# Patient Record
Sex: MALE | Race: BLACK OR AFRICAN AMERICAN | Employment: FULL TIME | URBAN - METROPOLITAN AREA
[De-identification: names, ages, dates, MRNs, and addresses within clinical notes are randomized per-mention and may not be internally consistent; named-entity substitution may affect disease eponyms.]

---

## 2022-06-21 ENCOUNTER — HOSPITAL ENCOUNTER (EMERGENCY)
Age: 42
Discharge: HOME | End: 2022-06-21
Attending: STUDENT IN AN ORGANIZED HEALTH CARE EDUCATION/TRAINING PROGRAM | Admitting: STUDENT IN AN ORGANIZED HEALTH CARE EDUCATION/TRAINING PROGRAM
Payer: COMMERCIAL

## 2022-06-21 ENCOUNTER — APPOINTMENT (OUTPATIENT)
Dept: CT IMAGING | Age: 42
End: 2022-06-21
Attending: STUDENT IN AN ORGANIZED HEALTH CARE EDUCATION/TRAINING PROGRAM
Payer: COMMERCIAL

## 2022-06-21 VITALS
RESPIRATION RATE: 19 BRPM | OXYGEN SATURATION: 99 % | WEIGHT: 165 LBS | SYSTOLIC BLOOD PRESSURE: 123 MMHG | TEMPERATURE: 98.1 F | DIASTOLIC BLOOD PRESSURE: 69 MMHG | HEIGHT: 72 IN | BODY MASS INDEX: 22.35 KG/M2 | HEART RATE: 62 BPM

## 2022-06-21 DIAGNOSIS — K52.9 COLITIS, ACUTE: Primary | ICD-10-CM

## 2022-06-21 LAB
ALBUMIN SERPL-MCNC: 4.2 G/DL (ref 3.4–5)
ALBUMIN/GLOB SERPL: 0.8 {RATIO} (ref 0.8–1.7)
ALP SERPL-CCNC: 88 U/L (ref 45–117)
ALT SERPL-CCNC: 29 U/L (ref 16–61)
ANION GAP SERPL CALC-SCNC: 8 MMOL/L (ref 3–18)
AST SERPL-CCNC: 24 U/L (ref 10–38)
BASOPHILS # BLD: 0 K/UL (ref 0–0.1)
BASOPHILS NFR BLD: 0 % (ref 0–2)
BILIRUB SERPL-MCNC: 0.4 MG/DL (ref 0.2–1)
BUN SERPL-MCNC: 15 MG/DL (ref 7–18)
BUN/CREAT SERPL: 14 (ref 12–20)
CALCIUM SERPL-MCNC: 9.4 MG/DL (ref 8.5–10.1)
CHLORIDE SERPL-SCNC: 103 MMOL/L (ref 100–111)
CO2 SERPL-SCNC: 25 MMOL/L (ref 21–32)
CREAT SERPL-MCNC: 1.09 MG/DL (ref 0.6–1.3)
DIFFERENTIAL METHOD BLD: ABNORMAL
EOSINOPHIL # BLD: 0 K/UL (ref 0–0.4)
EOSINOPHIL NFR BLD: 0 % (ref 0–5)
ERYTHROCYTE [DISTWIDTH] IN BLOOD BY AUTOMATED COUNT: 13 % (ref 11.6–14.5)
GLOBULIN SER CALC-MCNC: 5 G/DL (ref 2–4)
GLUCOSE SERPL-MCNC: 97 MG/DL (ref 74–99)
HCT VFR BLD AUTO: 40.4 % (ref 36–48)
HGB BLD-MCNC: 13 G/DL (ref 13–16)
IMM GRANULOCYTES # BLD AUTO: 0 K/UL (ref 0–0.04)
IMM GRANULOCYTES NFR BLD AUTO: 0 % (ref 0–0.5)
LIPASE SERPL-CCNC: 76 U/L (ref 73–393)
LYMPHOCYTES # BLD: 0.5 K/UL (ref 0.9–3.6)
LYMPHOCYTES NFR BLD: 5 % (ref 21–52)
MAGNESIUM SERPL-MCNC: 1.7 MG/DL (ref 1.6–2.6)
MCH RBC QN AUTO: 27.3 PG (ref 24–34)
MCHC RBC AUTO-ENTMCNC: 32.2 G/DL (ref 31–37)
MCV RBC AUTO: 84.7 FL (ref 78–100)
MONOCYTES # BLD: 0.4 K/UL (ref 0.05–1.2)
MONOCYTES NFR BLD: 4 % (ref 3–10)
NEUTS SEG # BLD: 9.3 K/UL (ref 1.8–8)
NEUTS SEG NFR BLD: 91 % (ref 40–73)
NRBC # BLD: 0 K/UL (ref 0–0.01)
NRBC BLD-RTO: 0 PER 100 WBC
PLATELET # BLD AUTO: 234 K/UL (ref 135–420)
PMV BLD AUTO: 10.4 FL (ref 9.2–11.8)
POTASSIUM SERPL-SCNC: 3.5 MMOL/L (ref 3.5–5.5)
PROT SERPL-MCNC: 9.2 G/DL (ref 6.4–8.2)
RBC # BLD AUTO: 4.77 M/UL (ref 4.35–5.65)
SODIUM SERPL-SCNC: 136 MMOL/L (ref 136–145)
WBC # BLD AUTO: 10.3 K/UL (ref 4.6–13.2)

## 2022-06-21 PROCEDURE — 96375 TX/PRO/DX INJ NEW DRUG ADDON: CPT

## 2022-06-21 PROCEDURE — 83735 ASSAY OF MAGNESIUM: CPT

## 2022-06-21 PROCEDURE — 74011250636 HC RX REV CODE- 250/636: Performed by: STUDENT IN AN ORGANIZED HEALTH CARE EDUCATION/TRAINING PROGRAM

## 2022-06-21 PROCEDURE — 85025 COMPLETE CBC W/AUTO DIFF WBC: CPT

## 2022-06-21 PROCEDURE — 74176 CT ABD & PELVIS W/O CONTRAST: CPT

## 2022-06-21 PROCEDURE — 74011000250 HC RX REV CODE- 250: Performed by: STUDENT IN AN ORGANIZED HEALTH CARE EDUCATION/TRAINING PROGRAM

## 2022-06-21 PROCEDURE — 83690 ASSAY OF LIPASE: CPT

## 2022-06-21 PROCEDURE — 74011250637 HC RX REV CODE- 250/637: Performed by: STUDENT IN AN ORGANIZED HEALTH CARE EDUCATION/TRAINING PROGRAM

## 2022-06-21 PROCEDURE — 96374 THER/PROPH/DIAG INJ IV PUSH: CPT

## 2022-06-21 PROCEDURE — 80053 COMPREHEN METABOLIC PANEL: CPT

## 2022-06-21 PROCEDURE — 99284 EMERGENCY DEPT VISIT MOD MDM: CPT

## 2022-06-21 PROCEDURE — C9113 INJ PANTOPRAZOLE SODIUM, VIA: HCPCS | Performed by: STUDENT IN AN ORGANIZED HEALTH CARE EDUCATION/TRAINING PROGRAM

## 2022-06-21 RX ORDER — ACETAMINOPHEN 500 MG
1000 TABLET ORAL ONCE
Status: COMPLETED | OUTPATIENT
Start: 2022-06-21 | End: 2022-06-21

## 2022-06-21 RX ORDER — AMOXICILLIN AND CLAVULANATE POTASSIUM 875; 125 MG/1; MG/1
1 TABLET, FILM COATED ORAL 2 TIMES DAILY
Qty: 20 TABLET | Refills: 0 | Status: SHIPPED | OUTPATIENT
Start: 2022-06-21 | End: 2022-07-01

## 2022-06-21 RX ORDER — ONDANSETRON 2 MG/ML
4 INJECTION INTRAMUSCULAR; INTRAVENOUS ONCE
Status: COMPLETED | OUTPATIENT
Start: 2022-06-21 | End: 2022-06-21

## 2022-06-21 RX ORDER — ONDANSETRON 4 MG/1
4 TABLET, FILM COATED ORAL
Qty: 14 TABLET | Refills: 0 | Status: SHIPPED | OUTPATIENT
Start: 2022-06-21

## 2022-06-21 RX ORDER — AMOXICILLIN AND CLAVULANATE POTASSIUM 875; 125 MG/1; MG/1
1 TABLET, FILM COATED ORAL
Status: COMPLETED | OUTPATIENT
Start: 2022-06-21 | End: 2022-06-21

## 2022-06-21 RX ORDER — OXYCODONE HYDROCHLORIDE 5 MG/1
5 TABLET ORAL
Qty: 12 TABLET | Refills: 0 | Status: SHIPPED | OUTPATIENT
Start: 2022-06-21 | End: 2022-07-02

## 2022-06-21 RX ORDER — MORPHINE SULFATE 4 MG/ML
4 INJECTION INTRAVENOUS ONCE
Status: COMPLETED | OUTPATIENT
Start: 2022-06-21 | End: 2022-06-21

## 2022-06-21 RX ORDER — DICYCLOMINE HYDROCHLORIDE 10 MG/1
10 CAPSULE ORAL 2 TIMES DAILY
Qty: 14 CAPSULE | Refills: 0 | Status: SHIPPED | OUTPATIENT
Start: 2022-06-21 | End: 2022-07-02

## 2022-06-21 RX ADMIN — SODIUM CHLORIDE 1000 ML: 900 INJECTION, SOLUTION INTRAVENOUS at 21:25

## 2022-06-21 RX ADMIN — ACETAMINOPHEN 1000 MG: 500 TABLET ORAL at 21:31

## 2022-06-21 RX ADMIN — LIDOCAINE HYDROCHLORIDE 40 ML: 20 SOLUTION ORAL; TOPICAL at 21:30

## 2022-06-21 RX ADMIN — ONDANSETRON 4 MG: 2 INJECTION INTRAMUSCULAR; INTRAVENOUS at 21:25

## 2022-06-21 RX ADMIN — SODIUM CHLORIDE 40 MG: 9 INJECTION INTRAMUSCULAR; INTRAVENOUS; SUBCUTANEOUS at 21:26

## 2022-06-21 RX ADMIN — MORPHINE SULFATE 4 MG: 4 INJECTION, SOLUTION INTRAMUSCULAR; INTRAVENOUS at 23:35

## 2022-06-21 RX ADMIN — AMOXICILLIN AND CLAVULANATE POTASSIUM 1 TABLET: 875; 125 TABLET, FILM COATED ORAL at 23:35

## 2022-06-21 NOTE — Clinical Note
[address] [place] [address] Dr [facility] EMERGENCY DEPT  [id number] [address] [phone]    Work/School Note    Date: 6/21/2022    To Whom It May concern:    Penny Asif was seen and treated today in the emergency room by the following provider(s):  Attending Provider: Charmayne Morton, DO. Penny Asif is excused from work/school on 06/21/22 and 06/22/22. He is medically clear to return to work/school on 6/23/2022.        Sincerely,          Juan Dudley RN

## 2022-06-21 NOTE — Clinical Note
[address] [place] [address] [address] [facility] EMERGENCY DEPT  [id number] [address] [phone]    Work/School Note    Date: 6/21/2022    To Whom It May concern:    Avtar Ernst was seen and treated today in the emergency room by the following provider(s):  Attending Provider: Lam Degroot DO. Avtar Ernst is excused from work/school on 06/21/22 and 06/22/22. He is medically clear to return to work/school on 6/23/2022.        Sincerely,          Laura Richard DO

## 2022-06-22 NOTE — ED PROVIDER NOTES
EMERGENCY DEPARTMENT HISTORY AND PHYSICAL EXAM      Date: 6/21/2022  Patient Name: Romana Fujisawa    History of Presenting Illness     Chief Complaint   Patient presents with    Abdominal Pain       History (Context): Romana Fujisawa is a 46 y.o. male with no significant past medical history comes into the ED today due to abdominal pain. Patient states abdominal pain began this morning is worsened since onset. He states that he is not taking any medication for treatment of his symptoms prior to arrival.  Was seen at a telehealth visit and prescribed medication however did not begin taking it yet. States pain is located mainly on the left upper and lower quadrant, and nonradiating. States that he has had 1 episode episode of vomiting and diarrhea. States symptoms are severe in quality. Denies any alleviating factors but states exacerbation with p.o. intake and ambulation. PCP: No primary care provider on file. Current Facility-Administered Medications   Medication Dose Route Frequency Provider Last Rate Last Admin    pantoprazole (PROTONIX) 40 mg in 0.9% sodium chloride 10 mL injection  40 mg IntraVENous ONCE Landon Hoffman,         acetaminophen (TYLENOL) tablet 1,000 mg  1,000 mg Oral ONCE Landon Hoffman, DO        mylanta/viscous lidocaine (GI COCKTAIL)  40 mL Oral ONCE Landon Hoffman, DO        ondansetron (ZOFRAN) injection 4 mg  4 mg IntraVENous ONCE Shanel Standing, DO           Past History     Past Medical History:   None reported    Past Surgical History:  No past surgical history on file. Family History:  No family history on file.     Social History:   Social History     Tobacco Use    Smoking status: Not on file    Smokeless tobacco: Not on file   Substance Use Topics    Alcohol use: Not on file    Drug use: Not on file     Denies alcohol or drug use    Allergies:  No Known Allergies    PMH, PSH, family history, social history, allergies reviewed with the patient with significant items noted above. Review of Systems   Review of Systems   Constitutional: Positive for fatigue. Negative for chills and fever. HENT: Negative for sore throat. Eyes: Negative for visual disturbance. Negative recent vision problems   Respiratory: Negative for shortness of breath. Cardiovascular: Negative for chest pain. Gastrointestinal: Positive for abdominal pain, diarrhea, nausea and vomiting. Genitourinary: Negative for difficulty urinating. Musculoskeletal: Negative for myalgias. Skin: Negative for rash. Neurological: Negative for headaches. Negative altered level of consciousness   All other systems reviewed and are negative. Physical Exam     Vitals:    06/21/22 2035   BP: 127/70   Pulse: 77   Resp: 19   Temp: 98.1 °F (36.7 °C)   SpO2: 100%   Weight: 74.8 kg (165 lb)   Height: 6' (1.829 m)       Physical Exam  Vitals and nursing note reviewed. Constitutional:       General: He is not in acute distress. Appearance: He is ill-appearing. He is not toxic-appearing. HENT:      Head: Normocephalic and atraumatic. Mouth/Throat:      Mouth: Mucous membranes are moist.   Eyes:      General: No scleral icterus. Conjunctiva/sclera: Conjunctivae normal.   Cardiovascular:      Rate and Rhythm: Normal rate and regular rhythm. Pulmonary:      Effort: Pulmonary effort is normal. No respiratory distress. Abdominal:      General: There is no distension. Palpations: Abdomen is soft. Tenderness: There is abdominal tenderness (To the left lower quadrant region). There is no guarding or rebound. Musculoskeletal:         General: No deformity. Normal range of motion. Cervical back: Normal range of motion and neck supple. Skin:     General: Skin is warm and dry. Findings: No rash. Neurological:      General: No focal deficit present. Mental Status: He is alert and oriented to person, place, and time. Mental status is at baseline. Psychiatric:         Mood and Affect: Mood normal.         Behavior: Behavior normal.         Diagnostic Study Results     Labs -   No results found for this or any previous visit (from the past 12 hour(s)). Labs Reviewed   CBC WITH AUTOMATED DIFF   METABOLIC PANEL, COMPREHENSIVE   LIPASE   MAGNESIUM       Radiologic Studies -   No orders to display     CT Results  (Last 48 hours)    None        CXR Results  (Last 48 hours)    None          The laboratory results, imaging results, and other diagnostic exams were reviewed in the EMR. Medical Decision Making   I am the first provider for this patient. I reviewed the vital signs, available nursing notes, past medical history, past surgical history, family history and social history. Vital Signs-Reviewed the patient's vital signs. Records Reviewed: Personally, on initial evaluation    MDM:   Fiona Cleveland presents with complaint of abdominal pain  DDX includes but is not limited to: Abdominal pain, diverticulitis, gastritis,    Patient overall ill-appearing but in no acute distress and vital signs grossly within normal limits. Will obtain lab work and imaging for further evaluation of patients complaint. Will continue to monitor and evaluate patient while in the ED. Orders as below:  Orders Placed This Encounter    CBC WITH AUTOMATED DIFF    COMPREHENSIVE METABOLIC PANEL    LIPASE    MAGNESIUM    pantoprazole (PROTONIX) 40 mg in 0.9% sodium chloride 10 mL injection    acetaminophen (TYLENOL) tablet 1,000 mg    mylanta/viscous lidocaine (GI COCKTAIL)    ondansetron (ZOFRAN) injection 4 mg    sodium chloride 0.9 % bolus infusion 1,000 mL        ED Course:   ED Course as of 06/22/22 0058   Tue Jun 21, 2022 2212 Patient's lab work grossly within normal limits. We will continue to monitor patient.  [DV]   1458 Patient CT scan shows colitis from the cecal tip to the distal transverse colon with mild inflammation/edema at the level of the cecum. Appendix normal.  No further signs of acute cholecystitis. Will provide patient with Augmentin while here in the emergency department for treatment of his colitis. Will recommend patient follow-up with GI for further evaluation. Patient verbalized understanding and is without any further questions. [DV]      ED Course User Index  [DV] Cari Postin, DO           Procedures:  Procedures        Diagnosis and Disposition     CLINICAL IMPRESSION:  No diagnosis found. There are no discharge medications for this patient. Disposition: Home    Patient condition at time of disposition: Stable    DISCHARGE NOTE:   Pt has been reexamined. Patient has no new complaints, changes, or physical findings. Care plan outlined and precautions discussed. Results were reviewed with the patient. All medications were reviewed with the patient. All of pt's questions and concerns were addressed. Alarm symptoms and return precautions associated with chief complaint and evaluation were reviewed with the patient in detail. The patient demonstrated adequate understanding. Patient was instructed to follow up with GI, as well as strict return precautions to the ED upon further deterioration. Patient is ready to go home. The patient is happy with this plan        Dragon Disclaimer     Please note that this dictation was completed with [email or web address], the computer voice recognition software. Quite often unanticipated grammatical, syntax, homophones, and other interpretive errors are inadvertently transcribed by the computer software. Please disregard these errors. Please excuse any errors that have escaped final proofreading. Zeinab CHILDS.

## 2022-06-24 ENCOUNTER — HOSPITAL ENCOUNTER (INPATIENT)
Age: 42
LOS: 8 days | Discharge: HOME | DRG: 853 | End: 2022-07-02
Attending: STUDENT IN AN ORGANIZED HEALTH CARE EDUCATION/TRAINING PROGRAM | Admitting: SURGERY
Payer: COMMERCIAL

## 2022-06-24 ENCOUNTER — APPOINTMENT (OUTPATIENT)
Dept: CT IMAGING | Age: 42
DRG: 853 | End: 2022-06-24
Attending: STUDENT IN AN ORGANIZED HEALTH CARE EDUCATION/TRAINING PROGRAM
Payer: COMMERCIAL

## 2022-06-24 DIAGNOSIS — K35.32 RUPTURED APPENDICITIS: Primary | ICD-10-CM

## 2022-06-24 DIAGNOSIS — G89.18 POSTOPERATIVE PAIN: ICD-10-CM

## 2022-06-24 LAB
ALBUMIN SERPL-MCNC: 3.3 G/DL (ref 3.4–5)
ALBUMIN/GLOB SERPL: 0.7 {RATIO} (ref 0.8–1.7)
ALP SERPL-CCNC: 126 U/L (ref 45–117)
ALT SERPL-CCNC: 33 U/L (ref 16–61)
ANION GAP SERPL CALC-SCNC: 7 MMOL/L (ref 3–18)
AST SERPL-CCNC: 35 U/L (ref 10–38)
BASOPHILS # BLD: 0 K/UL (ref 0–0.1)
BASOPHILS NFR BLD: 0 % (ref 0–2)
BILIRUB SERPL-MCNC: 0.7 MG/DL (ref 0.2–1)
BUN SERPL-MCNC: 13 MG/DL (ref 7–18)
BUN/CREAT SERPL: 10 (ref 12–20)
CALCIUM SERPL-MCNC: 8.6 MG/DL (ref 8.5–10.1)
CHLORIDE SERPL-SCNC: 95 MMOL/L (ref 100–111)
CO2 SERPL-SCNC: 30 MMOL/L (ref 21–32)
COVID-19 RAPID TEST, COVR: NOT DETECTED
CREAT SERPL-MCNC: 1.24 MG/DL (ref 0.6–1.3)
DIFFERENTIAL METHOD BLD: ABNORMAL
EOSINOPHIL # BLD: 0 K/UL (ref 0–0.4)
EOSINOPHIL NFR BLD: 0 % (ref 0–5)
ERYTHROCYTE [DISTWIDTH] IN BLOOD BY AUTOMATED COUNT: 12.9 % (ref 11.6–14.5)
GLOBULIN SER CALC-MCNC: 4.9 G/DL (ref 2–4)
GLUCOSE SERPL-MCNC: 115 MG/DL (ref 74–99)
HCT VFR BLD AUTO: 41.1 % (ref 36–48)
HGB BLD-MCNC: 13.7 G/DL (ref 13–16)
IMM GRANULOCYTES # BLD AUTO: 0 K/UL (ref 0–0.04)
IMM GRANULOCYTES NFR BLD AUTO: 0 % (ref 0–0.5)
LACTATE BLD-SCNC: 2.68 MMOL/L (ref 0.4–2)
LIPASE SERPL-CCNC: 64 U/L (ref 73–393)
LYMPHOCYTES # BLD: 0.8 K/UL (ref 0.9–3.6)
LYMPHOCYTES NFR BLD: 4 % (ref 21–52)
MAGNESIUM SERPL-MCNC: 2.3 MG/DL (ref 1.6–2.6)
MCH RBC QN AUTO: 27.5 PG (ref 24–34)
MCHC RBC AUTO-ENTMCNC: 33.3 G/DL (ref 31–37)
MCV RBC AUTO: 82.4 FL (ref 78–100)
MONOCYTES # BLD: 2.1 K/UL (ref 0.05–1.2)
MONOCYTES NFR BLD: 10 % (ref 3–10)
NEUTS SEG # BLD: 17.9 K/UL (ref 1.8–8)
NEUTS SEG NFR BLD: 86 % (ref 40–73)
NRBC # BLD: 0 K/UL (ref 0–0.01)
NRBC BLD-RTO: 0 PER 100 WBC
PLATELET # BLD AUTO: 197 K/UL (ref 135–420)
PLATELET COMMENTS,PCOM: ABNORMAL
PMV BLD AUTO: 9.8 FL (ref 9.2–11.8)
POTASSIUM SERPL-SCNC: 3.6 MMOL/L (ref 3.5–5.5)
PROT SERPL-MCNC: 8.2 G/DL (ref 6.4–8.2)
RBC # BLD AUTO: 4.99 M/UL (ref 4.35–5.65)
RBC MORPH BLD: ABNORMAL
RBC MORPH BLD: ABNORMAL
SODIUM SERPL-SCNC: 132 MMOL/L (ref 136–145)
SOURCE, COVRS: NORMAL
WBC # BLD AUTO: 20.8 K/UL (ref 4.6–13.2)
WBC MORPH BLD: ABNORMAL

## 2022-06-24 PROCEDURE — 99223 1ST HOSP IP/OBS HIGH 75: CPT | Performed by: SURGERY

## 2022-06-24 PROCEDURE — 85025 COMPLETE CBC W/AUTO DIFF WBC: CPT

## 2022-06-24 PROCEDURE — 83605 ASSAY OF LACTIC ACID: CPT

## 2022-06-24 PROCEDURE — 96375 TX/PRO/DX INJ NEW DRUG ADDON: CPT

## 2022-06-24 PROCEDURE — 87635 SARS-COV-2 COVID-19 AMP PRB: CPT

## 2022-06-24 PROCEDURE — 74177 CT ABD & PELVIS W/CONTRAST: CPT

## 2022-06-24 PROCEDURE — 83690 ASSAY OF LIPASE: CPT

## 2022-06-24 PROCEDURE — 74011000250 HC RX REV CODE- 250: Performed by: STUDENT IN AN ORGANIZED HEALTH CARE EDUCATION/TRAINING PROGRAM

## 2022-06-24 PROCEDURE — C9113 INJ PANTOPRAZOLE SODIUM, VIA: HCPCS | Performed by: STUDENT IN AN ORGANIZED HEALTH CARE EDUCATION/TRAINING PROGRAM

## 2022-06-24 PROCEDURE — 87040 BLOOD CULTURE FOR BACTERIA: CPT

## 2022-06-24 PROCEDURE — 74011000636 HC RX REV CODE- 636: Performed by: STUDENT IN AN ORGANIZED HEALTH CARE EDUCATION/TRAINING PROGRAM

## 2022-06-24 PROCEDURE — 96374 THER/PROPH/DIAG INJ IV PUSH: CPT

## 2022-06-24 PROCEDURE — 96372 THER/PROPH/DIAG INJ SC/IM: CPT

## 2022-06-24 PROCEDURE — 80053 COMPREHEN METABOLIC PANEL: CPT

## 2022-06-24 PROCEDURE — 99285 EMERGENCY DEPT VISIT HI MDM: CPT

## 2022-06-24 PROCEDURE — 74011250636 HC RX REV CODE- 250/636: Performed by: STUDENT IN AN ORGANIZED HEALTH CARE EDUCATION/TRAINING PROGRAM

## 2022-06-24 PROCEDURE — 65270000029 HC RM PRIVATE

## 2022-06-24 PROCEDURE — 83735 ASSAY OF MAGNESIUM: CPT

## 2022-06-24 PROCEDURE — 74011000258 HC RX REV CODE- 258: Performed by: STUDENT IN AN ORGANIZED HEALTH CARE EDUCATION/TRAINING PROGRAM

## 2022-06-24 RX ORDER — LORAZEPAM 2 MG/ML
1 INJECTION, SOLUTION INTRAMUSCULAR; INTRAVENOUS
Status: DISCONTINUED | OUTPATIENT
Start: 2022-06-24 | End: 2022-06-25 | Stop reason: HOSPADM

## 2022-06-24 RX ORDER — HYDROMORPHONE HYDROCHLORIDE 1 MG/ML
1 INJECTION, SOLUTION INTRAMUSCULAR; INTRAVENOUS; SUBCUTANEOUS
Status: DISCONTINUED | OUTPATIENT
Start: 2022-06-24 | End: 2022-06-25 | Stop reason: HOSPADM

## 2022-06-24 RX ORDER — SODIUM CHLORIDE 0.9 % (FLUSH) 0.9 %
5-40 SYRINGE (ML) INJECTION AS NEEDED
Status: DISCONTINUED | OUTPATIENT
Start: 2022-06-24 | End: 2022-06-25 | Stop reason: HOSPADM

## 2022-06-24 RX ORDER — NALOXONE HYDROCHLORIDE 0.4 MG/ML
0.4 INJECTION, SOLUTION INTRAMUSCULAR; INTRAVENOUS; SUBCUTANEOUS AS NEEDED
Status: DISCONTINUED | OUTPATIENT
Start: 2022-06-24 | End: 2022-06-25 | Stop reason: HOSPADM

## 2022-06-24 RX ORDER — MORPHINE SULFATE 4 MG/ML
4 INJECTION INTRAVENOUS ONCE
Status: COMPLETED | OUTPATIENT
Start: 2022-06-24 | End: 2022-06-24

## 2022-06-24 RX ORDER — SODIUM CHLORIDE, SODIUM LACTATE, POTASSIUM CHLORIDE, CALCIUM CHLORIDE 600; 310; 30; 20 MG/100ML; MG/100ML; MG/100ML; MG/100ML
125 INJECTION, SOLUTION INTRAVENOUS CONTINUOUS
Status: DISCONTINUED | OUTPATIENT
Start: 2022-06-24 | End: 2022-06-25 | Stop reason: HOSPADM

## 2022-06-24 RX ORDER — SODIUM CHLORIDE 0.9 % (FLUSH) 0.9 %
5-40 SYRINGE (ML) INJECTION EVERY 8 HOURS
Status: DISCONTINUED | OUTPATIENT
Start: 2022-06-24 | End: 2022-06-25 | Stop reason: HOSPADM

## 2022-06-24 RX ORDER — ACETAMINOPHEN 325 MG/1
650 TABLET ORAL EVERY 6 HOURS
Status: DISCONTINUED | OUTPATIENT
Start: 2022-06-25 | End: 2022-06-25 | Stop reason: HOSPADM

## 2022-06-24 RX ORDER — DIPHENHYDRAMINE HYDROCHLORIDE 50 MG/ML
12.5 INJECTION, SOLUTION INTRAMUSCULAR; INTRAVENOUS
Status: DISCONTINUED | OUTPATIENT
Start: 2022-06-24 | End: 2022-06-25 | Stop reason: HOSPADM

## 2022-06-24 RX ORDER — SODIUM CHLORIDE 9 MG/ML
100 INJECTION, SOLUTION INTRAVENOUS ONCE
Status: DISPENSED | OUTPATIENT
Start: 2022-06-24 | End: 2022-06-25

## 2022-06-24 RX ORDER — ONDANSETRON 2 MG/ML
4 INJECTION INTRAMUSCULAR; INTRAVENOUS ONCE
Status: COMPLETED | OUTPATIENT
Start: 2022-06-24 | End: 2022-06-24

## 2022-06-24 RX ORDER — DICYCLOMINE HYDROCHLORIDE 10 MG/ML
20 INJECTION INTRAMUSCULAR
Status: COMPLETED | OUTPATIENT
Start: 2022-06-24 | End: 2022-06-24

## 2022-06-24 RX ORDER — ONDANSETRON 2 MG/ML
4 INJECTION INTRAMUSCULAR; INTRAVENOUS
Status: DISCONTINUED | OUTPATIENT
Start: 2022-06-24 | End: 2022-06-25 | Stop reason: HOSPADM

## 2022-06-24 RX ADMIN — PIPERACILLIN AND TAZOBACTAM 4.5 G: 4; .5 INJECTION, POWDER, FOR SOLUTION INTRAVENOUS at 22:45

## 2022-06-24 RX ADMIN — SODIUM CHLORIDE 1000 ML: 900 INJECTION, SOLUTION INTRAVENOUS at 23:12

## 2022-06-24 RX ADMIN — ONDANSETRON 4 MG: 2 INJECTION INTRAMUSCULAR; INTRAVENOUS at 20:24

## 2022-06-24 RX ADMIN — MORPHINE SULFATE 4 MG: 4 INJECTION, SOLUTION INTRAMUSCULAR; INTRAVENOUS at 20:24

## 2022-06-24 RX ADMIN — IOPAMIDOL 100 ML: 612 INJECTION, SOLUTION INTRAVENOUS at 21:38

## 2022-06-24 RX ADMIN — SODIUM CHLORIDE 40 MG: 9 INJECTION INTRAMUSCULAR; INTRAVENOUS; SUBCUTANEOUS at 20:23

## 2022-06-24 RX ADMIN — DICYCLOMINE HYDROCHLORIDE 20 MG: 20 INJECTION, SOLUTION INTRAMUSCULAR at 20:23

## 2022-06-24 RX ADMIN — SODIUM CHLORIDE 1000 ML: 900 INJECTION, SOLUTION INTRAVENOUS at 23:13

## 2022-06-24 RX ADMIN — ONDANSETRON 4 MG: 2 INJECTION INTRAMUSCULAR; INTRAVENOUS at 22:40

## 2022-06-24 NOTE — Clinical Note
Status[de-identified] INPATIENT [101]   Type of Bed: Surgical [18]   Cardiac Monitoring Required?: Yes   Inpatient Hospitalization Certified Necessary for the Following Reasons: 3.  Patient receiving treatment that can only be provided in an inpatient setting (further clarification in H&P documentation)   Admitting Diagnosis: Ruptured appendicitis [4341355]   Admitting Physician: Gricelda Bedoya   Attending Physician: Gricelda Bedoya   Estimated Length of Stay: 2 Midnights   Discharge Plan[de-identified] Home with Office Follow-up

## 2022-06-24 NOTE — ED TRIAGE NOTES
EMS from home for abdominal pain and diarrhea. Recently seen and dx with colitis DC with antibiotics. Not getting better.

## 2022-06-24 NOTE — ED PROVIDER NOTES
EMERGENCY DEPARTMENT HISTORY AND PHYSICAL EXAM      Date: (Not on file)  Patient Name: Michael Astudillo    History of Presenting Illness     Chief Complaint   Patient presents with    Abdominal Pain       History (Context): Michael Astudillo is a 46 y.o. male with no significant past medical history comes into the ED today due to abdominal pain. Patient states he was seen here on the 21st for abdominal pain at that time was diagnosed with colitis. Has been taking his prescribed medication appropriately however states symptoms have not improved since onset. States exacerbation of his abdominal pain with ambulation and movement. Denies any further alleviating factors. Locates the abdominal pain to the suprapubic and left lower quadrant region. Denies any nausea, vomiting, diarrhea, fever, chest pain, dyspnea, or cough. Does admit to associated chills. States his symptoms are severe in quality. PCP: Lisbet Coley MD    Current Facility-Administered Medications   Medication Dose Route Frequency Provider Last Rate Last Admin    dicyclomine (BENTYL) 10 mg/mL injection 20 mg  20 mg IntraMUSCular NOW Landon Hoffman, DO        pantoprazole (PROTONIX) 40 mg in 0.9% sodium chloride 10 mL injection  40 mg IntraVENous ONCE Landon Hoffman,         morphine injection 4 mg  4 mg IntraVENous ONCE Landon Hoffman, DO        ondansetron (ZOFRAN) injection 4 mg  4 mg IntraVENous ONCE Nilay Crawford DO         Current Outpatient Medications   Medication Sig Dispense Refill    amoxicillin-clavulanate (Augmentin) 875-125 mg per tablet Take 1 Tablet by mouth two (2) times a day for 10 days. 20 Tablet 0    oxyCODONE IR (Roxicodone) 5 mg immediate release tablet Take 1 Tablet by mouth every six (6) hours as needed for Pain for up to 3 days. Max Daily Amount: 20 mg. 12 Tablet 0    dicyclomine (BENTYL) 10 mg capsule Take 1 Capsule by mouth two (2) times a day.  14 Capsule 0    ondansetron hcl (Zofran) 4 mg tablet Take 1 Tablet by mouth every eight (8) hours as needed for Nausea. 14 Tablet 0       Past History     Past Medical History:   None reported    Past Surgical History:  No past surgical history on file. Family History:  No family history on file. Social History:   Social History     Tobacco Use    Smoking status: Not on file    Smokeless tobacco: Not on file   Substance Use Topics    Alcohol use: Not on file    Drug use: Not on file     Denies drug use    Allergies:  No Known Allergies    PMH, PSH, family history, social history, allergies reviewed with the patient with significant items noted above. Review of Systems   Review of Systems   Constitutional: Positive for chills and fatigue. Negative for fever. HENT: Negative for sore throat. Eyes: Negative for visual disturbance. Negative recent vision problems   Respiratory: Negative for shortness of breath. Cardiovascular: Negative for chest pain. Gastrointestinal: Positive for abdominal pain. Negative for diarrhea, nausea and vomiting. Genitourinary: Negative for difficulty urinating. Musculoskeletal: Negative for myalgias. Skin: Negative for rash. Neurological: Negative for headaches. Negative altered level of consciousness   All other systems reviewed and are negative. Physical Exam   There were no vitals filed for this visit. Physical Exam  Vitals and nursing note reviewed. Constitutional:       General: He is not in acute distress. Appearance: Normal appearance. He is ill-appearing. He is not toxic-appearing. HENT:      Head: Normocephalic and atraumatic. Mouth/Throat:      Mouth: Mucous membranes are moist.   Eyes:      General: No scleral icterus. Conjunctiva/sclera: Conjunctivae normal.   Cardiovascular:      Rate and Rhythm: Normal rate and regular rhythm. Pulmonary:      Effort: Pulmonary effort is normal. No respiratory distress. Abdominal:      General: There is no distension. Palpations: Abdomen is soft. Tenderness: There is abdominal tenderness (diffusely with maximal ttp to the LLQ and suprapubic region). There is guarding. There is no rebound. Musculoskeletal:         General: No deformity. Normal range of motion. Cervical back: Normal range of motion and neck supple. Skin:     General: Skin is warm and dry. Findings: No rash. Neurological:      General: No focal deficit present. Mental Status: He is alert and oriented to person, place, and time. Mental status is at baseline. Psychiatric:         Mood and Affect: Mood normal.         Behavior: Behavior normal.         Diagnostic Study Results     Labs -   No results found for this or any previous visit (from the past 12 hour(s)). Labs Reviewed   CBC WITH AUTOMATED DIFF   METABOLIC PANEL, COMPREHENSIVE   LIPASE   MAGNESIUM       Radiologic Studies -   No orders to display     CT Results  (Last 48 hours)    None        CXR Results  (Last 48 hours)    None          The laboratory results, imaging results, and other diagnostic exams were reviewed in the EMR. Medical Decision Making   I am the first provider for this patient. I reviewed the vital signs, available nursing notes, past medical history, past surgical history, family history and social history. Vital Signs-Reviewed the patient's vital signs. Records Reviewed: Personally, on initial evaluation    MDM:   Rigoberto Bro presents with complaint of abdominal pain  DDX includes but is not limited to: Colitis, abdominal pain, UTI    Patient overall ill-appearing, no acute distress, vital signs gross within normal limits. Will obtain lab work for further evaluation of patients complaint. Will continue to monitor and evaluate patient while in the ED.       Orders as below:  Orders Placed This Encounter    CBC WITH AUTOMATED DIFF    COMPREHENSIVE METABOLIC PANEL    LIPASE    MAGNESIUM    dicyclomine (BENTYL) 10 mg/mL injection 20 mg    pantoprazole (PROTONIX) 40 mg in 0.9% sodium chloride 10 mL injection    morphine injection 4 mg    ondansetron (ZOFRAN) injection 4 mg        ED Course:   ED Course as of 06/24/22 2212 Fri Jun 24, 2022 2030 Patient's lab work significant for white count 20.8 up from 10.3, 3 days ago. Will obtain CT scan at this time. We will continue to monitor patient. [DV]   8446 Spoke to radiology who states patient likely has acute ruptured appendicitis at this time. Will provide patient with IV Zosyn and consult general surgery at this time. [DV]      ED Course User Index  [DV] Nile Jenkins DO           Procedures:  Procedures        Diagnosis and Disposition     CLINICAL IMPRESSION:  No diagnosis found. Current Discharge Medication List          Disposition: Admit    Patient condition at time of disposition: Stable      Dragon Disclaimer     Please note that this dictation was completed with [employer], the computer voice recognition software. Quite often unanticipated grammatical, syntax, homophones, and other interpretive errors are inadvertently transcribed by the computer software. Please disregard these errors. Please excuse any errors that have escaped final proofreading. Jose F CHILDS.

## 2022-06-25 ENCOUNTER — ANESTHESIA EVENT (OUTPATIENT)
Dept: SURGERY | Age: 42
DRG: 853 | End: 2022-06-25
Payer: COMMERCIAL

## 2022-06-25 ENCOUNTER — ANESTHESIA (OUTPATIENT)
Dept: SURGERY | Age: 42
DRG: 853 | End: 2022-06-25
Payer: COMMERCIAL

## 2022-06-25 LAB
ALBUMIN SERPL-MCNC: 2.7 G/DL (ref 3.4–5)
ALBUMIN/GLOB SERPL: 0.6 {RATIO} (ref 0.8–1.7)
ALP SERPL-CCNC: 104 U/L (ref 45–117)
ALT SERPL-CCNC: 25 U/L (ref 16–61)
ANION GAP SERPL CALC-SCNC: 7 MMOL/L (ref 3–18)
AST SERPL-CCNC: 26 U/L (ref 10–38)
BASOPHILS # BLD: 0 K/UL (ref 0–0.1)
BASOPHILS NFR BLD: 0 % (ref 0–2)
BILIRUB SERPL-MCNC: 0.5 MG/DL (ref 0.2–1)
BUN SERPL-MCNC: 14 MG/DL (ref 7–18)
BUN/CREAT SERPL: 13 (ref 12–20)
CALCIUM SERPL-MCNC: 8 MG/DL (ref 8.5–10.1)
CHLORIDE SERPL-SCNC: 100 MMOL/L (ref 100–111)
CO2 SERPL-SCNC: 27 MMOL/L (ref 21–32)
CREAT SERPL-MCNC: 1.1 MG/DL (ref 0.6–1.3)
DIFFERENTIAL METHOD BLD: ABNORMAL
EOSINOPHIL # BLD: 0 K/UL (ref 0–0.4)
EOSINOPHIL NFR BLD: 0 % (ref 0–5)
ERYTHROCYTE [DISTWIDTH] IN BLOOD BY AUTOMATED COUNT: 12.9 % (ref 11.6–14.5)
GLOBULIN SER CALC-MCNC: 4.7 G/DL (ref 2–4)
GLUCOSE SERPL-MCNC: 115 MG/DL (ref 74–99)
HCT VFR BLD AUTO: 36.2 % (ref 36–48)
HGB BLD-MCNC: 11.8 G/DL (ref 13–16)
IMM GRANULOCYTES # BLD AUTO: 0 K/UL
IMM GRANULOCYTES NFR BLD AUTO: 0 %
LYMPHOCYTES # BLD: 0.2 K/UL (ref 0.9–3.6)
LYMPHOCYTES NFR BLD: 1 % (ref 21–52)
MCH RBC QN AUTO: 27 PG (ref 24–34)
MCHC RBC AUTO-ENTMCNC: 32.6 G/DL (ref 31–37)
MCV RBC AUTO: 82.8 FL (ref 78–100)
MONOCYTES # BLD: 1.9 K/UL (ref 0.05–1.2)
MONOCYTES NFR BLD: 9 % (ref 3–10)
NEUTS SEG # BLD: 18.6 K/UL (ref 1.8–8)
NEUTS SEG NFR BLD: 90 % (ref 40–73)
NRBC # BLD: 0 K/UL (ref 0–0.01)
NRBC BLD-RTO: 0 PER 100 WBC
PLATELET # BLD AUTO: 166 K/UL (ref 135–420)
PLATELET COMMENTS,PCOM: ABNORMAL
PMV BLD AUTO: 9.8 FL (ref 9.2–11.8)
POTASSIUM SERPL-SCNC: 3.6 MMOL/L (ref 3.5–5.5)
PROT SERPL-MCNC: 7.4 G/DL (ref 6.4–8.2)
RBC # BLD AUTO: 4.37 M/UL (ref 4.35–5.65)
RBC MORPH BLD: ABNORMAL
SODIUM SERPL-SCNC: 134 MMOL/L (ref 136–145)
WBC # BLD AUTO: 20.7 K/UL (ref 4.6–13.2)

## 2022-06-25 PROCEDURE — 77030031139 HC SUT VCRL2 J&J -A: Performed by: SURGERY

## 2022-06-25 PROCEDURE — 76210000016 HC OR PH I REC 1 TO 1.5 HR: Performed by: SURGERY

## 2022-06-25 PROCEDURE — 77030003028 HC SUT VCRL J&J -A: Performed by: SURGERY

## 2022-06-25 PROCEDURE — 87075 CULTR BACTERIA EXCEPT BLOOD: CPT

## 2022-06-25 PROCEDURE — 51798 US URINE CAPACITY MEASURE: CPT

## 2022-06-25 PROCEDURE — 74011250636 HC RX REV CODE- 250/636: Performed by: NURSE ANESTHETIST, CERTIFIED REGISTERED

## 2022-06-25 PROCEDURE — 77030008771 HC TU NG SALEM SUMP -A: Performed by: NURSE ANESTHETIST, CERTIFIED REGISTERED

## 2022-06-25 PROCEDURE — 74011000250 HC RX REV CODE- 250: Performed by: NURSE ANESTHETIST, CERTIFIED REGISTERED

## 2022-06-25 PROCEDURE — 87186 SC STD MICRODIL/AGAR DIL: CPT

## 2022-06-25 PROCEDURE — 74011000258 HC RX REV CODE- 258: Performed by: SURGERY

## 2022-06-25 PROCEDURE — 65270000029 HC RM PRIVATE

## 2022-06-25 PROCEDURE — 99140 ANES COMP EMERGENCY COND: CPT | Performed by: NURSE ANESTHETIST, CERTIFIED REGISTERED

## 2022-06-25 PROCEDURE — 76010000153 HC OR TIME 1.5 TO 2 HR: Performed by: SURGERY

## 2022-06-25 PROCEDURE — 77030018836 HC SOL IRR NACL ICUM -A: Performed by: SURGERY

## 2022-06-25 PROCEDURE — 77030012770 HC TRCR OPT FX AMR -B: Performed by: SURGERY

## 2022-06-25 PROCEDURE — 77030011296 HC FCPS GRSP ENDOSC J&J -B: Performed by: SURGERY

## 2022-06-25 PROCEDURE — 85025 COMPLETE CBC W/AUTO DIFF WBC: CPT

## 2022-06-25 PROCEDURE — 74011250636 HC RX REV CODE- 250/636: Performed by: SURGERY

## 2022-06-25 PROCEDURE — 87077 CULTURE AEROBIC IDENTIFY: CPT

## 2022-06-25 PROCEDURE — 74011250637 HC RX REV CODE- 250/637: Performed by: SURGERY

## 2022-06-25 PROCEDURE — 44970 LAPAROSCOPY APPENDECTOMY: CPT | Performed by: SURGERY

## 2022-06-25 PROCEDURE — 0DTJ4ZZ RESECTION OF APPENDIX, PERCUTANEOUS ENDOSCOPIC APPROACH: ICD-10-PCS | Performed by: SURGERY

## 2022-06-25 PROCEDURE — 94760 N-INVAS EAR/PLS OXIMETRY 1: CPT

## 2022-06-25 PROCEDURE — 2709999900 HC NON-CHARGEABLE SUPPLY: Performed by: SURGERY

## 2022-06-25 PROCEDURE — 77030009967 HC RELD STPLR ENDOSC J&J -C: Performed by: SURGERY

## 2022-06-25 PROCEDURE — 36415 COLL VENOUS BLD VENIPUNCTURE: CPT

## 2022-06-25 PROCEDURE — 00840 ANES IPER PX LOWER ABD NOS: CPT | Performed by: ANESTHESIOLOGY

## 2022-06-25 PROCEDURE — 80053 COMPREHEN METABOLIC PANEL: CPT

## 2022-06-25 PROCEDURE — 77030008608 HC TRCR ENDOSC SMTH AMR -B: Performed by: SURGERY

## 2022-06-25 PROCEDURE — 77030009851 HC PCH RTVR ENDOSC AMR -B: Performed by: SURGERY

## 2022-06-25 PROCEDURE — 77030013079 HC BLNKT BAIR HGGR 3M -A: Performed by: NURSE ANESTHETIST, CERTIFIED REGISTERED

## 2022-06-25 PROCEDURE — 77030020829: Performed by: SURGERY

## 2022-06-25 PROCEDURE — 77010033678 HC OXYGEN DAILY

## 2022-06-25 PROCEDURE — 77030026438 HC STYL ET INTUB CARD -A: Performed by: NURSE ANESTHETIST, CERTIFIED REGISTERED

## 2022-06-25 PROCEDURE — 77030002933 HC SUT MCRYL J&J -A: Performed by: SURGERY

## 2022-06-25 PROCEDURE — 99140 ANES COMP EMERGENCY COND: CPT | Performed by: ANESTHESIOLOGY

## 2022-06-25 PROCEDURE — 87205 SMEAR GRAM STAIN: CPT

## 2022-06-25 PROCEDURE — 88304 TISSUE EXAM BY PATHOLOGIST: CPT

## 2022-06-25 PROCEDURE — 76060000034 HC ANESTHESIA 1.5 TO 2 HR: Performed by: SURGERY

## 2022-06-25 PROCEDURE — 74011000250 HC RX REV CODE- 250: Performed by: SURGERY

## 2022-06-25 PROCEDURE — 77030012406 HC DRN WND PENRS BARD -A: Performed by: SURGERY

## 2022-06-25 PROCEDURE — 00840 ANES IPER PX LOWER ABD NOS: CPT | Performed by: NURSE ANESTHETIST, CERTIFIED REGISTERED

## 2022-06-25 PROCEDURE — 77030018842 HC SOL IRR SOD CL 9% BAXT -A

## 2022-06-25 PROCEDURE — 77030011810 HC STPLR ENDOSC J&J -G: Performed by: SURGERY

## 2022-06-25 RX ORDER — OXYCODONE HYDROCHLORIDE 10 MG/1
10 TABLET ORAL
Status: DISCONTINUED | OUTPATIENT
Start: 2022-06-25 | End: 2022-06-29 | Stop reason: SDUPTHER

## 2022-06-25 RX ORDER — SODIUM CHLORIDE 0.9 % (FLUSH) 0.9 %
5-40 SYRINGE (ML) INJECTION EVERY 8 HOURS
Status: DISCONTINUED | OUTPATIENT
Start: 2022-06-25 | End: 2022-07-02 | Stop reason: HOSPADM

## 2022-06-25 RX ORDER — FENTANYL CITRATE 50 UG/ML
INJECTION, SOLUTION INTRAMUSCULAR; INTRAVENOUS AS NEEDED
Status: DISCONTINUED | OUTPATIENT
Start: 2022-06-25 | End: 2022-06-25 | Stop reason: HOSPADM

## 2022-06-25 RX ORDER — FENTANYL CITRATE 50 UG/ML
50 INJECTION, SOLUTION INTRAMUSCULAR; INTRAVENOUS AS NEEDED
Status: DISCONTINUED | OUTPATIENT
Start: 2022-06-25 | End: 2022-06-25 | Stop reason: HOSPADM

## 2022-06-25 RX ORDER — NALOXONE HYDROCHLORIDE 0.4 MG/ML
0.4 INJECTION, SOLUTION INTRAMUSCULAR; INTRAVENOUS; SUBCUTANEOUS AS NEEDED
Status: DISCONTINUED | OUTPATIENT
Start: 2022-06-25 | End: 2022-07-02 | Stop reason: HOSPADM

## 2022-06-25 RX ORDER — NALOXONE HYDROCHLORIDE 0.4 MG/ML
0.04 INJECTION, SOLUTION INTRAMUSCULAR; INTRAVENOUS; SUBCUTANEOUS AS NEEDED
Status: DISCONTINUED | OUTPATIENT
Start: 2022-06-25 | End: 2022-06-25 | Stop reason: HOSPADM

## 2022-06-25 RX ORDER — DEXAMETHASONE SODIUM PHOSPHATE 4 MG/ML
INJECTION, SOLUTION INTRA-ARTICULAR; INTRALESIONAL; INTRAMUSCULAR; INTRAVENOUS; SOFT TISSUE AS NEEDED
Status: DISCONTINUED | OUTPATIENT
Start: 2022-06-25 | End: 2022-06-25 | Stop reason: HOSPADM

## 2022-06-25 RX ORDER — ENOXAPARIN SODIUM 100 MG/ML
40 INJECTION SUBCUTANEOUS EVERY 24 HOURS
Status: DISCONTINUED | OUTPATIENT
Start: 2022-06-25 | End: 2022-07-02 | Stop reason: HOSPADM

## 2022-06-25 RX ORDER — ACETAMINOPHEN 325 MG/1
650 TABLET ORAL EVERY 6 HOURS
Status: DISCONTINUED | OUTPATIENT
Start: 2022-06-25 | End: 2022-07-02 | Stop reason: HOSPADM

## 2022-06-25 RX ORDER — SODIUM CHLORIDE 0.9 % (FLUSH) 0.9 %
5-40 SYRINGE (ML) INJECTION EVERY 8 HOURS
Status: DISCONTINUED | OUTPATIENT
Start: 2022-06-25 | End: 2022-06-25 | Stop reason: HOSPADM

## 2022-06-25 RX ORDER — MIDAZOLAM HYDROCHLORIDE 1 MG/ML
INJECTION, SOLUTION INTRAMUSCULAR; INTRAVENOUS AS NEEDED
Status: DISCONTINUED | OUTPATIENT
Start: 2022-06-25 | End: 2022-06-25 | Stop reason: HOSPADM

## 2022-06-25 RX ORDER — SODIUM CHLORIDE 9 MG/ML
1000 INJECTION, SOLUTION INTRAVENOUS ONCE
Status: COMPLETED | OUTPATIENT
Start: 2022-06-25 | End: 2022-06-25

## 2022-06-25 RX ORDER — SODIUM CHLORIDE, SODIUM LACTATE, POTASSIUM CHLORIDE, CALCIUM CHLORIDE 600; 310; 30; 20 MG/100ML; MG/100ML; MG/100ML; MG/100ML
75 INJECTION, SOLUTION INTRAVENOUS CONTINUOUS
Status: DISCONTINUED | OUTPATIENT
Start: 2022-06-25 | End: 2022-06-25 | Stop reason: HOSPADM

## 2022-06-25 RX ORDER — SODIUM CHLORIDE 0.9 % (FLUSH) 0.9 %
5-40 SYRINGE (ML) INJECTION AS NEEDED
Status: DISCONTINUED | OUTPATIENT
Start: 2022-06-25 | End: 2022-07-02 | Stop reason: HOSPADM

## 2022-06-25 RX ORDER — HYDROMORPHONE HYDROCHLORIDE 1 MG/ML
1 INJECTION, SOLUTION INTRAMUSCULAR; INTRAVENOUS; SUBCUTANEOUS
Status: DISCONTINUED | OUTPATIENT
Start: 2022-06-25 | End: 2022-06-27

## 2022-06-25 RX ORDER — ONDANSETRON 2 MG/ML
INJECTION INTRAMUSCULAR; INTRAVENOUS AS NEEDED
Status: DISCONTINUED | OUTPATIENT
Start: 2022-06-25 | End: 2022-06-25 | Stop reason: HOSPADM

## 2022-06-25 RX ORDER — PROPOFOL 10 MG/ML
INJECTION, EMULSION INTRAVENOUS AS NEEDED
Status: DISCONTINUED | OUTPATIENT
Start: 2022-06-25 | End: 2022-06-25 | Stop reason: HOSPADM

## 2022-06-25 RX ORDER — ROCURONIUM BROMIDE 10 MG/ML
INJECTION, SOLUTION INTRAVENOUS AS NEEDED
Status: DISCONTINUED | OUTPATIENT
Start: 2022-06-25 | End: 2022-06-25 | Stop reason: HOSPADM

## 2022-06-25 RX ORDER — SUCCINYLCHOLINE CHLORIDE 20 MG/ML
INJECTION INTRAMUSCULAR; INTRAVENOUS AS NEEDED
Status: DISCONTINUED | OUTPATIENT
Start: 2022-06-25 | End: 2022-06-25 | Stop reason: HOSPADM

## 2022-06-25 RX ORDER — GLYCOPYRROLATE 0.2 MG/ML
INJECTION INTRAMUSCULAR; INTRAVENOUS AS NEEDED
Status: DISCONTINUED | OUTPATIENT
Start: 2022-06-25 | End: 2022-06-25 | Stop reason: HOSPADM

## 2022-06-25 RX ORDER — HYDROMORPHONE HYDROCHLORIDE 2 MG/ML
0.5 INJECTION, SOLUTION INTRAMUSCULAR; INTRAVENOUS; SUBCUTANEOUS
Status: DISCONTINUED | OUTPATIENT
Start: 2022-06-25 | End: 2022-06-25 | Stop reason: HOSPADM

## 2022-06-25 RX ORDER — ONDANSETRON 2 MG/ML
4 INJECTION INTRAMUSCULAR; INTRAVENOUS
Status: DISCONTINUED | OUTPATIENT
Start: 2022-06-25 | End: 2022-07-02 | Stop reason: HOSPADM

## 2022-06-25 RX ORDER — FAMOTIDINE 20 MG/1
20 TABLET, FILM COATED ORAL ONCE
Status: DISCONTINUED | OUTPATIENT
Start: 2022-06-25 | End: 2022-06-25 | Stop reason: HOSPADM

## 2022-06-25 RX ORDER — NEOSTIGMINE METHYLSULFATE 1 MG/ML
INJECTION, SOLUTION INTRAVENOUS AS NEEDED
Status: DISCONTINUED | OUTPATIENT
Start: 2022-06-25 | End: 2022-06-25 | Stop reason: HOSPADM

## 2022-06-25 RX ORDER — LIDOCAINE HYDROCHLORIDE 20 MG/ML
INJECTION, SOLUTION EPIDURAL; INFILTRATION; INTRACAUDAL; PERINEURAL AS NEEDED
Status: DISCONTINUED | OUTPATIENT
Start: 2022-06-25 | End: 2022-06-25 | Stop reason: HOSPADM

## 2022-06-25 RX ORDER — SODIUM CHLORIDE 0.9 % (FLUSH) 0.9 %
5-40 SYRINGE (ML) INJECTION AS NEEDED
Status: DISCONTINUED | OUTPATIENT
Start: 2022-06-25 | End: 2022-06-25 | Stop reason: HOSPADM

## 2022-06-25 RX ORDER — OXYCODONE AND ACETAMINOPHEN 5; 325 MG/1; MG/1
1 TABLET ORAL ONCE
Status: DISCONTINUED | OUTPATIENT
Start: 2022-06-25 | End: 2022-06-25 | Stop reason: HOSPADM

## 2022-06-25 RX ORDER — DIPHENHYDRAMINE HYDROCHLORIDE 50 MG/ML
12.5 INJECTION, SOLUTION INTRAMUSCULAR; INTRAVENOUS
Status: DISCONTINUED | OUTPATIENT
Start: 2022-06-25 | End: 2022-07-02 | Stop reason: HOSPADM

## 2022-06-25 RX ORDER — KETOROLAC TROMETHAMINE 15 MG/ML
INJECTION, SOLUTION INTRAMUSCULAR; INTRAVENOUS AS NEEDED
Status: DISCONTINUED | OUTPATIENT
Start: 2022-06-25 | End: 2022-06-25 | Stop reason: HOSPADM

## 2022-06-25 RX ORDER — ONDANSETRON 2 MG/ML
4 INJECTION INTRAMUSCULAR; INTRAVENOUS ONCE
Status: DISCONTINUED | OUTPATIENT
Start: 2022-06-25 | End: 2022-06-25 | Stop reason: HOSPADM

## 2022-06-25 RX ORDER — SODIUM CHLORIDE, SODIUM LACTATE, POTASSIUM CHLORIDE, CALCIUM CHLORIDE 600; 310; 30; 20 MG/100ML; MG/100ML; MG/100ML; MG/100ML
125 INJECTION, SOLUTION INTRAVENOUS CONTINUOUS
Status: DISCONTINUED | OUTPATIENT
Start: 2022-06-25 | End: 2022-06-25 | Stop reason: HOSPADM

## 2022-06-25 RX ADMIN — PROPOFOL 200 MG: 10 INJECTION, EMULSION INTRAVENOUS at 07:31

## 2022-06-25 RX ADMIN — HYDROMORPHONE HYDROCHLORIDE 1 MG: 1 INJECTION, SOLUTION INTRAMUSCULAR; INTRAVENOUS; SUBCUTANEOUS at 22:29

## 2022-06-25 RX ADMIN — ONDANSETRON 4 MG: 2 INJECTION INTRAMUSCULAR; INTRAVENOUS at 07:46

## 2022-06-25 RX ADMIN — ROCURONIUM BROMIDE 10 MG: 50 INJECTION INTRAVENOUS at 07:58

## 2022-06-25 RX ADMIN — MIDAZOLAM HYDROCHLORIDE 2 MG: 2 INJECTION, SOLUTION INTRAMUSCULAR; INTRAVENOUS at 07:23

## 2022-06-25 RX ADMIN — ACETAMINOPHEN 650 MG: 325 TABLET ORAL at 14:18

## 2022-06-25 RX ADMIN — LIDOCAINE HYDROCHLORIDE 100 MG: 20 INJECTION, SOLUTION EPIDURAL; INFILTRATION; INTRACAUDAL; PERINEURAL at 07:31

## 2022-06-25 RX ADMIN — SODIUM CHLORIDE, POTASSIUM CHLORIDE, SODIUM LACTATE AND CALCIUM CHLORIDE 125 ML/HR: 600; 310; 30; 20 INJECTION, SOLUTION INTRAVENOUS at 00:33

## 2022-06-25 RX ADMIN — PIPERACILLIN AND TAZOBACTAM 3.38 G: 3; .375 INJECTION, POWDER, LYOPHILIZED, FOR SOLUTION INTRAVENOUS at 05:09

## 2022-06-25 RX ADMIN — Medication 3 MG: at 08:50

## 2022-06-25 RX ADMIN — GLYCOPYRROLATE 0.4 MG: 0.2 INJECTION INTRAMUSCULAR; INTRAVENOUS at 08:50

## 2022-06-25 RX ADMIN — SODIUM CHLORIDE 1000 ML: 9 INJECTION, SOLUTION INTRAVENOUS at 20:14

## 2022-06-25 RX ADMIN — FENTANYL CITRATE 50 MCG: 50 INJECTION, SOLUTION INTRAMUSCULAR; INTRAVENOUS at 07:23

## 2022-06-25 RX ADMIN — HYDROMORPHONE HYDROCHLORIDE 1 MG: 1 INJECTION, SOLUTION INTRAMUSCULAR; INTRAVENOUS; SUBCUTANEOUS at 00:32

## 2022-06-25 RX ADMIN — PIPERACILLIN AND TAZOBACTAM 3.38 G: 3; .375 INJECTION, POWDER, LYOPHILIZED, FOR SOLUTION INTRAVENOUS at 14:18

## 2022-06-25 RX ADMIN — SODIUM CHLORIDE, PRESERVATIVE FREE 10 ML: 5 INJECTION INTRAVENOUS at 00:33

## 2022-06-25 RX ADMIN — HYDROMORPHONE HYDROCHLORIDE 0.5 MG: 2 INJECTION, SOLUTION INTRAMUSCULAR; INTRAVENOUS; SUBCUTANEOUS at 09:32

## 2022-06-25 RX ADMIN — SODIUM CHLORIDE 1000 ML/HR: 9 INJECTION, SOLUTION INTRAVENOUS at 04:56

## 2022-06-25 RX ADMIN — SUCCINYLCHOLINE CHLORIDE 100 MG: 20 INJECTION, SOLUTION INTRAMUSCULAR; INTRAVENOUS at 07:31

## 2022-06-25 RX ADMIN — FENTANYL CITRATE 50 MCG: 50 INJECTION, SOLUTION INTRAMUSCULAR; INTRAVENOUS at 07:54

## 2022-06-25 RX ADMIN — ENOXAPARIN SODIUM 40 MG: 100 INJECTION SUBCUTANEOUS at 18:29

## 2022-06-25 RX ADMIN — SODIUM CHLORIDE, PRESERVATIVE FREE 10 ML: 5 INJECTION INTRAVENOUS at 05:09

## 2022-06-25 RX ADMIN — DEXAMETHASONE SODIUM PHOSPHATE 4 MG: 4 INJECTION, SOLUTION INTRAMUSCULAR; INTRAVENOUS at 07:46

## 2022-06-25 RX ADMIN — HYDROMORPHONE HYDROCHLORIDE 1 MG: 1 INJECTION, SOLUTION INTRAMUSCULAR; INTRAVENOUS; SUBCUTANEOUS at 14:28

## 2022-06-25 RX ADMIN — ROCURONIUM BROMIDE 30 MG: 50 INJECTION INTRAVENOUS at 07:38

## 2022-06-25 RX ADMIN — HYDROMORPHONE HYDROCHLORIDE 0.5 MG: 2 INJECTION, SOLUTION INTRAMUSCULAR; INTRAVENOUS; SUBCUTANEOUS at 09:16

## 2022-06-25 RX ADMIN — ACETAMINOPHEN 650 MG: 325 TABLET ORAL at 23:36

## 2022-06-25 RX ADMIN — KETOROLAC TROMETHAMINE 15 MG: 15 INJECTION, SOLUTION INTRAMUSCULAR; INTRAVENOUS at 07:46

## 2022-06-25 RX ADMIN — SODIUM CHLORIDE, PRESERVATIVE FREE 10 ML: 5 INJECTION INTRAVENOUS at 14:19

## 2022-06-25 RX ADMIN — HYDROMORPHONE HYDROCHLORIDE 0.5 MG: 2 INJECTION, SOLUTION INTRAMUSCULAR; INTRAVENOUS; SUBCUTANEOUS at 09:43

## 2022-06-25 RX ADMIN — ROCURONIUM BROMIDE 5 MG: 50 INJECTION INTRAVENOUS at 07:30

## 2022-06-25 NOTE — PERIOP NOTES
TRANSFER - OUT REPORT:    Verbal report given to Anisha(name) on Jefferson Boyd  being transferred to (unit) for routine post - op       Report consisted of patients Situation, Background, Assessment and   Recommendations(SBAR). Information from the following report(s) Procedure Summary and MAR was reviewed with the receiving nurse. Lines:   Peripheral IV 06/24/22 Right Hand (Active)   Site Assessment Clean, dry, & intact 06/25/22 0914   Phlebitis Assessment 0 06/25/22 0914   Infiltration Assessment 0 06/25/22 0914   Dressing Status Clean, dry, & intact 06/25/22 0914   Dressing Type Transparent;Tape 06/25/22 0914   Hub Color/Line Status Capped 06/25/22 0914       Peripheral IV 06/24/22 Left Antecubital (Active)   Site Assessment Clean, dry, & intact 06/25/22 0914   Phlebitis Assessment 0 06/25/22 0914   Infiltration Assessment 0 06/25/22 0914   Dressing Status Clean, dry, & intact 06/25/22 0914   Dressing Type Transparent 06/25/22 0914   Hub Color/Line Status Infusing 06/25/22 0914        Opportunity for questions and clarification was provided.       Patient transported with:  [facility]

## 2022-06-25 NOTE — ANESTHESIA PREPROCEDURE EVALUATION
Relevant Problems   No relevant active problems       Anesthetic History   No history of anesthetic complications            Review of Systems / Medical History  Patient summary reviewed and pertinent labs reviewed    Pulmonary  Within defined limits                 Neuro/Psych   Within defined limits           Cardiovascular  Within defined limits                     GI/Hepatic/Renal  Within defined limits              Endo/Other  Within defined limits           Other Findings              Physical Exam    Airway  Mallampati: II  TM Distance: 4 - 6 cm  Neck ROM: normal range of motion   Mouth opening: Normal     Cardiovascular               Dental  No notable dental hx       Pulmonary                 Abdominal  GI exam deferred       Other Findings            Anesthetic Plan    ASA: 1, emergent  Anesthesia type: general          Induction: Intravenous  Anesthetic plan and risks discussed with: Patient

## 2022-06-25 NOTE — H&P
[facility]  General Surgery    Subjective:     CC: Acute perforated appendicitis     HPI: Patient is a very pleasant 53-year-old male with a past medical history which is unremarkable. He presented to the emergency department yesterday with complaints of worsening right lower quadrant pain. He had been seen in the emergency department 2 days prior with similar complaints. His work-up included a CT abdomen pelvis which was read as colitis. The patient was discharged home on oral medication. The symptoms continue to worsen. He had a repeat CT which revealed acute appendicitis with small perforation without abscess. Patient Active Problem List    Diagnosis Date Noted    Ruptured appendicitis 06/24/2022    Acute perforated appendicitis 06/24/2022     No past medical history on file. No past surgical history on file. No family history on file. Social History     Tobacco Use    Smoking status: Not on file    Smokeless tobacco: Not on file   Substance Use Topics    Alcohol use: Not on file      No Known Allergies    Prior to Admission medications    Medication Sig Start Date End Date Taking? Authorizing Provider   amoxicillin-clavulanate (Augmentin) 875-125 mg per tablet Take 1 Tablet by mouth two (2) times a day for 10 days. 6/21/22 7/1/22  Reddy Danielson,    oxyCODONE IR (Roxicodone) 5 mg immediate release tablet Take 1 Tablet by mouth every six (6) hours as needed for Pain for up to 3 days. Max Daily Amount: 20 mg. 6/21/22 6/24/22  Reddy Danielson DO   dicyclomine (BENTYL) 10 mg capsule Take 1 Capsule by mouth two (2) times a day. 6/21/22   Reddy Danielson DO   ondansetron hcl (Zofran) 4 mg tablet Take 1 Tablet by mouth every eight (8) hours as needed for Nausea. 6/21/22   Reddy Danielson, DO       Review of Systems:    14 systems were reviewed. The results are as above in the HPI and otherwise negative.      Objective:     Vitals:    06/24/22 1959 06/24/22 2245   BP: 109/66 118/73   Pulse: 86 83   Resp: 16 14   Temp: 99.7 °F (37.6 °C)    SpO2: 99%        Physical Exam:  GENERAL: alert, cooperative, no distress, appears stated age,   EYE: conjunctivae/corneas clear. PERRL, EOM's intact. THROAT & NECK: normal and no erythema or exudates noted. ,    LYMPHATIC: Cervical, supraclavicular, and axillary nodes normal. ,   LUNG: clear to auscultation bilaterally,   HEART: regular rate and rhythm, S1, S2 normal, no murmur, click, rub or gallop,   ABDOMEN: soft, non-distended, right lower quadrant rebound tenderness. Bowel sounds normal. No masses,  no organomegaly,   EXTREMITIES:  extremities normal, atraumatic, no cyanosis or edema,   SKIN: Normal.,   NEUROLOGIC: AOx3. Cranial nerves 2-12 and sensation grossly intact. ,     Data Review:   Recent Results (from the past 24 hour(s))   CBC WITH AUTOMATED DIFF    Collection Time: 06/24/22  8:05 PM   Result Value Ref Range    WBC 20.8 (H) 4.6 - 13.2 K/uL    RBC 4.99 4.35 - 5.65 M/uL    HGB 13.7 13.0 - 16.0 g/dL    HCT 41.1 36.0 - 48.0 %    MCV 82.4 78.0 - 100.0 FL    MCH 27.5 24.0 - 34.0 PG    MCHC 33.3 31.0 - 37.0 g/dL    RDW 12.9 11.6 - 14.5 %    PLATELET 810 502 - 130 K/uL    MPV 9.8 9.2 - 11.8 FL    NRBC 0.0 0  WBC    ABSOLUTE NRBC 0.00 0.00 - 0.01 K/uL    NEUTROPHILS 86 (H) 40 - 73 %    LYMPHOCYTES 4 (L) 21 - 52 %    MONOCYTES 10 3 - 10 %    EOSINOPHILS 0 0 - 5 %    BASOPHILS 0 0 - 2 %    IMMATURE GRANULOCYTES 0 0.0 - 0.5 %    ABS. NEUTROPHILS 17.9 (H) 1.8 - 8.0 K/UL    ABS. LYMPHOCYTES 0.8 (L) 0.9 - 3.6 K/UL    ABS. MONOCYTES 2.1 (H) 0.05 - 1.2 K/UL    ABS. EOSINOPHILS 0.0 0.0 - 0.4 K/UL    ABS. BASOPHILS 0.0 0.0 - 0.1 K/UL    ABS. IMM.  GRANS. 0.0 0.00 - 0.04 K/UL    DF MANUAL      PLATELET COMMENTS ADEQUATE PLATELETS      RBC COMMENTS NORMOCYTIC, NORMOCHROMIC      RBC COMMENTS BISHOP CELLS  1+        WBC COMMENTS VACUOLATED POLYS     METABOLIC PANEL, COMPREHENSIVE    Collection Time: 06/24/22  8:05 PM   Result Value Ref Range Sodium 132 (L) 136 - 145 mmol/L    Potassium 3.6 3.5 - 5.5 mmol/L    Chloride 95 (L) 100 - 111 mmol/L    CO2 30 21 - 32 mmol/L    Anion gap 7 3.0 - 18 mmol/L    Glucose 115 (H) 74 - 99 mg/dL    BUN 13 7.0 - 18 MG/DL    Creatinine 1.24 0.6 - 1.3 MG/DL    BUN/Creatinine ratio 10 (L) 12 - 20      GFR est AA >60 >60 ml/min/1.73m2    GFR est non-AA >60 >60 ml/min/1.73m2    Calcium 8.6 8.5 - 10.1 MG/DL    Bilirubin, total 0.7 0.2 - 1.0 MG/DL    ALT (SGPT) 33 16 - 61 U/L    AST (SGOT) 35 10 - 38 U/L    Alk.  phosphatase 126 (H) 45 - 117 U/L    Protein, total 8.2 6.4 - 8.2 g/dL    Albumin 3.3 (L) 3.4 - 5.0 g/dL    Globulin 4.9 (H) 2.0 - 4.0 g/dL    A-G Ratio 0.7 (L) 0.8 - 1.7     LIPASE    Collection Time: 06/24/22  8:05 PM   Result Value Ref Range    Lipase 64 (L) 73 - 393 U/L   MAGNESIUM    Collection Time: 06/24/22  8:05 PM   Result Value Ref Range    Magnesium 2.3 1.6 - 2.6 mg/dL   POC LACTIC ACID    Collection Time: 06/24/22 10:53 PM   Result Value Ref Range    Lactic Acid (POC) 2.68 (HH) 0.40 - 2.00 mmol/L       Impression:     · Patient with acute perforated appendicitis without abscess    Plan:     · Consent on chart  · Preoperative orders written  · Laparoscopic appendectomy with possible open appendectomy    Signed By: Theodore Wagoner MD     June 24, 2022

## 2022-06-25 NOTE — PROGRESS NOTES
Problem: Pain  Goal: *Control of Pain  Outcome: Progressing Towards Goal     Problem: Patient Education: Go to Patient Education Activity  Goal: Patient/Family Education  Outcome: Progressing Towards Goal     Problem: Falls - Risk of  Goal: *Absence of Falls  Description: Document Gina Led Fall Risk and appropriate interventions in the flowsheet.   Outcome: Progressing Towards Goal  Note: Fall Risk Interventions:  Mobility Interventions: Patient to call before getting OOB         Medication Interventions: Patient to call before getting OOB    Elimination Interventions: Call light in reach              Problem: Patient Education: Go to Patient Education Activity  Goal: Patient/Family Education  Outcome: Progressing Towards Goal     Problem: Surgical Pathway Day of Surgery  Goal: Consults, if ordered  Outcome: Progressing Towards Goal  Goal: Nutrition/Diet  Outcome: Progressing Towards Goal  Goal: Medications  Outcome: Progressing Towards Goal  Goal: Respiratory  Outcome: Progressing Towards Goal  Goal: Treatments/Interventions/Procedures  Outcome: Progressing Towards Goal  Goal: Psychosocial  Outcome: Progressing Towards Goal  Goal: *No signs and symptoms of infection or wound complications  Outcome: Progressing Towards Goal

## 2022-06-25 NOTE — ED NOTES
Assumed care of pt in ed bed 14. Pt brought by wheelchair from ft. Pt awake, alert and in mild distress, c/o severe abdominal pain x 3 days. States he was seen on 6/21 and dx with colitis and sent home. Pt reports increased abd pain with n/v since d/c. Pt connected to cardiac monitor, will medicate and obtain additional blood specimens.

## 2022-06-25 NOTE — ANESTHESIA POSTPROCEDURE EVALUATION
Procedure(s):  APPENDECTOMY LAPAROSCOPIC. general    Anesthesia Post Evaluation      Multimodal analgesia: multimodal analgesia used between 6 hours prior to anesthesia start to PACU discharge  Patient location during evaluation: bedside  Patient participation: complete - patient participated  Level of consciousness: awake  Pain management: adequate  Airway patency: patent  Anesthetic complications: no  Cardiovascular status: stable  Respiratory status: acceptable  Hydration status: acceptable  Post anesthesia nausea and vomiting:  controlled      INITIAL Post-op Vital signs:   Vitals Value Taken Time   /70 06/25/22 1012   Temp 36.7 °C (98 °F) 06/25/22 0914   Pulse 73 06/25/22 1016   Resp 10 06/25/22 1016   SpO2 100 % 06/25/22 1016   Vitals shown include unvalidated device data.

## 2022-06-25 NOTE — OP NOTES
Laparoscopic Appendectomy Operative Note    Pre-operative Diagnosis: acute perforated appendicitis without abscess    Post-operative Diagnosis: Acute perforated appendicitis with abscess    Procedure: Laparoscopic Appendectomy    Surgeon: Salina Yun MD    Assistant: Conor Ashley SA    Anesthesia: General with local (.5 % marcaine)    Findings: acute appendicitis, nonperforated    Specimens: Appendix    Estimated Blood Loss:  10 mL    Total IV Fluids: 750 ml    Drains: 19 Fr    Implants: None    Complications: None    Operative indication:  Acute non perforated appendicitis    Procedure Details: The patient was identified in the holding area   where consent for laparoscopic, possible open, appendectomy   was verified. In the operating room, a time-out was performed to insure   correct procedure. The abdomen was prepped and draped in sterile fashion   using chlorhexidine solution and sterile drapes. The laparoscopic equipment   was assembled and proper function was visualized prior to the initial   incision. The local anesthetic was infiltrated into the skin and deep   dermal tissues at each proposed incision site prior to the incision. The   initial incision was made to accommodate a 5-mm, blunt-tipped trocar   assembled to the 5-mm 0-degree scope to create the Optiview system. Safe   entry into the peritoneal cavity was visualized. The pneumoperitoneum was   obtained using carbon dioxide gas to 15 mmHg. A second trocar, a 5-mm   blunt-tipped trocar was placed into the suprapubic region. The second trocar was later removed and moved closer to the pubic bone to allow better manipulation of the bowel and the appendix. A third   trocar was placed at the left anterior superior iliac spine, a 12-mm   blunt-tipped trocar. With all trocars in place, the patient was placed in   Trendelenburg with the right side up. Attention was turned to the right   lower quadrant where the cecum could be visualized.   The batsheva Thompson of the cecum was followed to the appendix which was adherent to the right pelvic sidewall. Blunt dissection of the appendix from the abscess cavity which I performed freed the pus which was promptly suctioned away from the pelvis. Irrigation was performed. No injury to the small bowel or sigmoid colon which helped to make up the abscess cavity was noted. With the appendix now free the attention was turned to the cecal appendiceal base. The avascular window   between the appendix and the mesoappendix was bluntly dissected away. The   blue load of JORGE stapler was used to transect the appendix at its base. A   vascular load was used to transect the mesoappendix. The specimen was   removed from the peritoneal cavity using the EndoCatch. The 12 mm trocar was reinserted. The bowel was run from the terminal ileum to beyond the area which was adherent in the pelvis. The bowel was inspected. No evidence of injury was noted. The sigmoid colon was similarly inspected. No note of injury. The irrigation of the former abscess cavity was performed again. Total of 2 L of irrigation were employed. The 19 round ROSEANNE drain was placed into the peritoneal cavity through the 12 mm trocar and exited from the suprapubic 5 mm trocar. This was secured using 2-0 silk suture. The drain was positioned in the pelvis. The area of the mesoappendix and appendiceal base were inspected without any evidence of bleeding or stool leak respectively. The 12 mm trocar site was closed using the suture passer and 0 Vicryl suture. The pneumoperitoneum was   allowed to deflate. All trocars were removed, under direct visualization. The skin at each trocar site was closed using 4-0 Monocryl suture. Sterile   dressings were applied. The patient tolerated the procedure very well. Disposition: PACU - hemodynamically stable.            Condition: stable

## 2022-06-25 NOTE — BRIEF OP NOTE
Brief Postoperative Note    Patient: Michael Astudillo  YOB: 1980  MRN: [id number]    Date of Procedure: 6/25/2022     Pre-Op Diagnosis: ruptured appendix    Post-Op Diagnosis: Same as preoperative diagnosis. Procedure(s):  APPENDECTOMY LAPAROSCOPIC    Surgeon(s): Makayla Paredes MD    Surgical Assistant: Surg Asst-1: Sarina Major    Anesthesia: General     Estimated Blood Loss (mL): Minimal    Complications: None    Specimens:   ID Type Source Tests Collected by Time Destination   1 : appendix Preservative Appendix  Makayla Paredes MD 6/25/2022 1796 Pathology   1 : appendix fluid Wound  CULTURE, ANAEROBIC, CULTURE, WOUND W GRAM STAIN Makayla Paredes MD 6/25/2022 2160 Microbiology        Implants: * No implants in log *    Drains:   Orogastric Tube 06/25/22 (Active)       Drain 19 fr. j p drain 06/25/22 Anterior Abdomen (Active)   Site Assessment Clean, dry, & intact 06/25/22 0914   Dressing Status Clean, dry, & intact 06/25/22 0914   Status Charged; Patent 06/25/22 0914   Drainage Description Serosanguinous 06/25/22 0914       Findings: acute perforated appendicitis with abscess    Electronically Signed by Michael Astudillo MD on 6/25/2022 at 10:09 AM

## 2022-06-25 NOTE — ROUTINE PROCESS
TRANSFER - IN REPORT:    Verbal report received from [address] (name) on Ronald Erb  being received from PACU (unit) for routine post - op      Report consisted of patients Situation, Background, Assessment and   Recommendations(SBAR). Information from the following report(s) SBAR, OR Summary, Intake/Output, MAR and Recent Results was reviewed with the receiving nurse. Opportunity for questions and clarification was provided. Assessment completed upon patients arrival to unit and care assumed.

## 2022-06-26 LAB
ANION GAP SERPL CALC-SCNC: 6 MMOL/L (ref 3–18)
BASOPHILS # BLD: 0 K/UL (ref 0–0.1)
BASOPHILS NFR BLD: 0 % (ref 0–2)
BUN SERPL-MCNC: 14 MG/DL (ref 7–18)
BUN/CREAT SERPL: 14 (ref 12–20)
CALCIUM SERPL-MCNC: 7.3 MG/DL (ref 8.5–10.1)
CHLORIDE SERPL-SCNC: 102 MMOL/L (ref 100–111)
CO2 SERPL-SCNC: 26 MMOL/L (ref 21–32)
CREAT SERPL-MCNC: 0.99 MG/DL (ref 0.6–1.3)
DIFFERENTIAL METHOD BLD: ABNORMAL
EOSINOPHIL # BLD: 0 K/UL (ref 0–0.4)
EOSINOPHIL NFR BLD: 0 % (ref 0–5)
ERYTHROCYTE [DISTWIDTH] IN BLOOD BY AUTOMATED COUNT: 13.2 % (ref 11.6–14.5)
GLUCOSE SERPL-MCNC: 95 MG/DL (ref 74–99)
HCT VFR BLD AUTO: 32.6 % (ref 36–48)
HGB BLD-MCNC: 10.7 G/DL (ref 13–16)
IMM GRANULOCYTES # BLD AUTO: 0 K/UL
IMM GRANULOCYTES NFR BLD AUTO: 0 %
LYMPHOCYTES # BLD: 0.4 K/UL (ref 0.9–3.6)
LYMPHOCYTES NFR BLD: 3 % (ref 21–52)
MCH RBC QN AUTO: 27.1 PG (ref 24–34)
MCHC RBC AUTO-ENTMCNC: 32.8 G/DL (ref 31–37)
MCV RBC AUTO: 82.5 FL (ref 78–100)
MONOCYTES # BLD: 1.4 K/UL (ref 0.05–1.2)
MONOCYTES NFR BLD: 10 % (ref 3–10)
NEUTS BAND NFR BLD MANUAL: 7 % (ref 0–5)
NEUTS SEG # BLD: 12.4 K/UL (ref 1.8–8)
NEUTS SEG NFR BLD: 80 % (ref 40–73)
NRBC # BLD: 0 K/UL (ref 0–0.01)
NRBC BLD-RTO: 0 PER 100 WBC
PLATELET # BLD AUTO: 190 K/UL (ref 135–420)
PLATELET COMMENTS,PCOM: ABNORMAL
PMV BLD AUTO: 9.9 FL (ref 9.2–11.8)
POTASSIUM SERPL-SCNC: 3.9 MMOL/L (ref 3.5–5.5)
RBC # BLD AUTO: 3.95 M/UL (ref 4.35–5.65)
RBC MORPH BLD: ABNORMAL
SODIUM SERPL-SCNC: 134 MMOL/L (ref 136–145)
WBC # BLD AUTO: 14.2 K/UL (ref 4.6–13.2)

## 2022-06-26 PROCEDURE — 36415 COLL VENOUS BLD VENIPUNCTURE: CPT

## 2022-06-26 PROCEDURE — 74011000250 HC RX REV CODE- 250: Performed by: SURGERY

## 2022-06-26 PROCEDURE — 74011250637 HC RX REV CODE- 250/637: Performed by: SURGERY

## 2022-06-26 PROCEDURE — 80048 BASIC METABOLIC PNL TOTAL CA: CPT

## 2022-06-26 PROCEDURE — 74011000258 HC RX REV CODE- 258: Performed by: SURGERY

## 2022-06-26 PROCEDURE — 65270000029 HC RM PRIVATE

## 2022-06-26 PROCEDURE — 77010033678 HC OXYGEN DAILY

## 2022-06-26 PROCEDURE — 74011250636 HC RX REV CODE- 250/636: Performed by: SURGERY

## 2022-06-26 PROCEDURE — 85025 COMPLETE CBC W/AUTO DIFF WBC: CPT

## 2022-06-26 RX ORDER — DEXTROSE, SODIUM CHLORIDE, AND POTASSIUM CHLORIDE 5; .9; .15 G/100ML; G/100ML; G/100ML
75 INJECTION INTRAVENOUS CONTINUOUS
Status: DISCONTINUED | OUTPATIENT
Start: 2022-06-26 | End: 2022-06-26

## 2022-06-26 RX ADMIN — PIPERACILLIN AND TAZOBACTAM 3.38 G: 3; .375 INJECTION, POWDER, LYOPHILIZED, FOR SOLUTION INTRAVENOUS at 19:34

## 2022-06-26 RX ADMIN — HYDROMORPHONE HYDROCHLORIDE 1 MG: 1 INJECTION, SOLUTION INTRAMUSCULAR; INTRAVENOUS; SUBCUTANEOUS at 21:45

## 2022-06-26 RX ADMIN — PIPERACILLIN AND TAZOBACTAM 3.38 G: 3; .375 INJECTION, POWDER, LYOPHILIZED, FOR SOLUTION INTRAVENOUS at 02:36

## 2022-06-26 RX ADMIN — SODIUM CHLORIDE, PRESERVATIVE FREE 10 ML: 5 INJECTION INTRAVENOUS at 13:29

## 2022-06-26 RX ADMIN — ONDANSETRON 4 MG: 2 INJECTION INTRAMUSCULAR; INTRAVENOUS at 21:38

## 2022-06-26 RX ADMIN — ENOXAPARIN SODIUM 40 MG: 100 INJECTION SUBCUTANEOUS at 15:09

## 2022-06-26 RX ADMIN — HYDROMORPHONE HYDROCHLORIDE 1 MG: 1 INJECTION, SOLUTION INTRAMUSCULAR; INTRAVENOUS; SUBCUTANEOUS at 18:01

## 2022-06-26 RX ADMIN — HYDROMORPHONE HYDROCHLORIDE 1 MG: 1 INJECTION, SOLUTION INTRAMUSCULAR; INTRAVENOUS; SUBCUTANEOUS at 07:48

## 2022-06-26 RX ADMIN — POTASSIUM CHLORIDE: 2 INJECTION, SOLUTION, CONCENTRATE INTRAVENOUS at 11:08

## 2022-06-26 RX ADMIN — ACETAMINOPHEN 650 MG: 325 TABLET ORAL at 06:21

## 2022-06-26 RX ADMIN — PIPERACILLIN AND TAZOBACTAM 3.38 G: 3; .375 INJECTION, POWDER, LYOPHILIZED, FOR SOLUTION INTRAVENOUS at 11:08

## 2022-06-26 RX ADMIN — ACETAMINOPHEN 650 MG: 325 TABLET ORAL at 11:08

## 2022-06-26 RX ADMIN — ACETAMINOPHEN 650 MG: 325 TABLET ORAL at 17:24

## 2022-06-26 RX ADMIN — HYDROMORPHONE HYDROCHLORIDE 1 MG: 1 INJECTION, SOLUTION INTRAMUSCULAR; INTRAVENOUS; SUBCUTANEOUS at 13:29

## 2022-06-26 NOTE — PROGRESS NOTES
Pt requested a cup of ice water. Pt sated will ambulate at 5:30pm  Informed pt that we may be taking out the maxwell and do a voiding trial and if he is still retaining we may have to replace the maxwell. Pt stated he does not want to do that at this time. He states he does not want to do an end and out thing with the maxwell.

## 2022-06-26 NOTE — PROGRESS NOTES
conducted an initial consultation and Spiritual Assessment for [facility], who is a 46 y.o.,male. Patient's Primary Language is: [place]. According to the patient's EMR Baptism Affiliation is: No Episcopal. The reason the Patient came to the hospital is:   Patient Active Problem List    Diagnosis Date Noted    Ruptured appendicitis 06/24/2022    Acute perforated appendicitis 06/24/2022        The  provided the following Interventions:  Initiated a relationship of care and support through this introductory visit. Explored for spiritual needs while hospitalized. Provided information about Spiritual Care Services and availability of chaplains for spiritual support. Chart reviewed. The following outcomes where achieved:  Patient expressed gratitude for 's visit. Assessment:  Patient does not have any Mormonism/cultural needs that will affect patient's preferences in health care. There are no spiritual or Mormonism issues which require intervention at this time. Plan:  Chaplains will continue to follow and will provide pastoral care on an as needed/requested basis.  recommends bedside caregivers page  on duty if patient shows signs of acute spiritual or emotional distress.     [address]   [phone]

## 2022-06-26 NOTE — PROGRESS NOTES
06/26/22 1325   Pain 1   Pain Scale 1 Numeric (0 - 10)   Pain Intensity 1 8   Patient Stated Pain Goal 2   Pain Reassessment 1 Patient resting w/respiratory rate greater than 10   Pain Onset 1 post op   Pain Location 1 Abdomen   Pain Orientation 1 Anterior; Inner   Pain Description 1 Aching   Pain Intervention(s) 1 Medication (see MAR)   Pt complained of 8/10 pain in abdomen. Medicated with PRN Dilaudid  Remains alert.

## 2022-06-26 NOTE — PROGRESS NOTES
Problem: Pain  Goal: *Control of Pain  Outcome: Progressing Towards Goal     Problem: Patient Education: Go to Patient Education Activity  Goal: Patient/Family Education  Outcome: Progressing Towards Goal     Problem: Falls - Risk of  Goal: *Absence of Falls  Description: Document Megan Flow Fall Risk and appropriate interventions in the flowsheet.   Outcome: Progressing Towards Goal  Note: Fall Risk Interventions:  Mobility Interventions: Patient to call before getting OOB         Medication Interventions: Patient to call before getting OOB    Elimination Interventions: Call light in reach,Patient to call for help with toileting needs              Problem: Patient Education: Go to Patient Education Activity  Goal: Patient/Family Education  Outcome: Progressing Towards Goal

## 2022-06-26 NOTE — PROGRESS NOTES
Pt is resting in bed no s/s of any pain or discomfort. Good appetite no nausea or vomiting. Pain med effective. Pt encouraged to ambulate throughout the day. Family at bedside. Will continue to monitor.  Call bell within reach

## 2022-06-26 NOTE — PROGRESS NOTES
06/26/22 0709   Pain 1   Pain Scale 1 Numeric (0 - 10)   Pain Intensity 1 8   Patient Stated Pain Goal 2   Pain Reassessment 1 Patient resting w/respiratory rate greater than 10   Pain Location 1 Abdomen   Pain Orientation 1 Anterior; Inner   Pain Description 1 Aching   Pain Intervention(s) 1 Medication (see MAR)   Pt complained of abdomen pain 8/10.  Medicated with PRN Dilaudid 1mg

## 2022-06-26 NOTE — PROGRESS NOTES
06/26/22 1800   Pain 1   Pain Scale 1 Numeric (0 - 10)   Pain Intensity 1 8   Patient Stated Pain Goal 4   Pain Reassessment 1 Patient resting w/respiratory rate greater than 10   Pain Location 1 Abdomen   Pain Orientation 1 Anterior; Inner   Pain Description 1 Aching   Pain Intervention(s) 1 Medication (see MAR)   Pt is in a lot of pain at the moment. Feels like a lot of gas. Pt was medicated with IV Dilaudid and would like to rest before he ambulates.

## 2022-06-26 NOTE — PROGRESS NOTES
1905 Received report from off [address] [facility],[address]. Pt with family ambulating at the hallway. 2150 Pt nauseous and having pain. Zofran Inj. 4mg and dilaudid 1 mg administered. Pt resting    0140 Pt c/o nausea and pain  given Zofran 4 mg IV inj. and dilaudid 1 mg . Also ginger ale given. Bedside and Verbal shift change report given to [address] (oncoming nurse) by Lanny Ontiveros RN (offgoing nurse). Report included the following information SBAR, Kardex, Intake/Output and MAR.

## 2022-06-26 NOTE — PROGRESS NOTES
Bedside and Verbal shift change report given to Salome Everett RN (oncoming nurse) by Curly Scott RN (offgoing nurse). Report included the following information SBAR, Kardex, Intake/Output and MAR.

## 2022-06-26 NOTE — PROGRESS NOTES
Kevin Ibrahim M.D. FACS  PROGRESS NOTE    Name: Theodore Waogner MRN: [id number]   : 1980 Hospital: [facility]   Date: 2022 Admission Date: 2022  8:01 PM     Hospital Day: 3  1 Day Post-Op  Subjective:  Patient without acute overnight events. Patient did have oliguria and a Sow catheter had to be placed. Urine output has been adequate since that time. He does still have significant abdominal pain. Objective:  Vitals:    22 1722 22 1954 22 2307 22 0411   BP: 107/73 117/78 115/71 121/74   Pulse: 91 90 87 81   Resp: 15 16 16 16   Temp: 98.2 °F (36.8 °C) 98.9 °F (37.2 °C) 98.3 °F (36.8 °C) 98.3 °F (36.8 °C)   SpO2: 98% 98% 97% 97%     Date 22 0700 - 22 0659 22 0700 - 22 0659   Shift 7912-6295 1988-4412 24 Hour Total 6497-4781 3187-4848 24 Hour Total   INTAKE   P.O. 60 240 300        P. O. 60 240 300      I.V. 750  750        Volume (lactated Ringers infusion) 750  750      Shift Total       OUTPUT   Urine  1050 1050        Urine Output (mL) (Urinary Catheter 22)  1050 1050      Drains 550  550        Output (ml) (Drain 19 fr. j p drain 22 Anterior Abdomen) 550  550      Blood 10  10        Estimated Blood Loss 10  10      Shift Total 560 1050 1610       -810 -560      Weight (kg)               Physical Exam:    General: Awake and alert, oriented x4, no apparent distress   Abdomen: abdomen is soft with generalized tenderness but no rebound or guarding. Incision(s) are C/D/I.  ROSEANNE drain site clean and clear. Serosanguineous drainage in ROSEANNE.   No masses, organomegaly or guarding   Extremities: No c/c/e BLE  Labs:  Recent Results (from the past 24 hour(s))   CULTURE, BODY FLUID W GRAM STAIN    Collection Time: 22  8:34 AM    Specimen: Fluid   Result Value Ref Range    Special Requests: NO SPECIAL REQUESTS      GRAM STAIN RARE WBCS SEEN      GRAM STAIN NO ORGANISMS SEEN      Culture result: PENDING CBC WITH AUTOMATED DIFF    Collection Time: 06/26/22  3:50 AM   Result Value Ref Range    WBC 14.2 (H) 4.6 - 13.2 K/uL    RBC 3.95 (L) 4.35 - 5.65 M/uL    HGB 10.7 (L) 13.0 - 16.0 g/dL    HCT 32.6 (L) 36.0 - 48.0 %    MCV 82.5 78.0 - 100.0 FL    MCH 27.1 24.0 - 34.0 PG    MCHC 32.8 31.0 - 37.0 g/dL    RDW 13.2 11.6 - 14.5 %    PLATELET 852 652 - 578 K/uL    MPV 9.9 9.2 - 11.8 FL    NRBC 0.0 0  WBC    ABSOLUTE NRBC 0.00 0.00 - 0.01 K/uL    NEUTROPHILS 80 (H) 40 - 73 %    BAND NEUTROPHILS 7 (H) 0 - 5 %    LYMPHOCYTES 3 (L) 21 - 52 %    MONOCYTES 10 3 - 10 %    EOSINOPHILS 0 0 - 5 %    BASOPHILS 0 0 - 2 %    IMMATURE GRANULOCYTES 0 %    ABS. NEUTROPHILS 12.4 (H) 1.8 - 8.0 K/UL    ABS. LYMPHOCYTES 0.4 (L) 0.9 - 3.6 K/UL    ABS. MONOCYTES 1.4 (H) 0.05 - 1.2 K/UL    ABS. EOSINOPHILS 0.0 0.0 - 0.4 K/UL    ABS. BASOPHILS 0.0 0.0 - 0.1 K/UL    ABS. IMM.  GRANS. 0.0 K/UL    DF MANUAL      PLATELET COMMENTS ADEQUATE PLATELETS      RBC COMMENTS NORMOCYTIC, NORMOCHROMIC     METABOLIC PANEL, BASIC    Collection Time: 06/26/22  3:50 AM   Result Value Ref Range    Sodium 134 (L) 136 - 145 mmol/L    Potassium 3.9 3.5 - 5.5 mmol/L    Chloride 102 100 - 111 mmol/L    CO2 26 21 - 32 mmol/L    Anion gap 6 3.0 - 18 mmol/L    Glucose 95 74 - 99 mg/dL    BUN 14 7.0 - 18 MG/DL    Creatinine 0.99 0.6 - 1.3 MG/DL    BUN/Creatinine ratio 14 12 - 20      GFR est AA >60 >60 ml/min/1.73m2    GFR est non-AA >60 >60 ml/min/1.73m2    Calcium 7.3 (L) 8.5 - 10.1 MG/DL     All Micro Results     Procedure Component Value Units Date/Time    CULTURE, BLOOD [[id number]] Collected: 06/24/22 2245    Order Status: Completed Specimen: Blood Updated: 06/26/22 0655     Special Requests: NO SPECIAL REQUESTS        Culture result: NO GROWTH 2 DAYS       CULTURE, BLOOD [[id number]] Collected: 06/24/22 2230    Order Status: Completed Specimen: Blood Updated: 06/26/22 0655     Special Requests: NO SPECIAL REQUESTS        Culture result: NO GROWTH 2 DAYS CULTURE, ANAEROBIC [[id number]] Collected: 06/25/22 0824    Order Status: Completed Specimen: Surgical Specimen Updated: 06/26/22 0009    CULTURE, BODY FLUID Juan Gerardo STAIN [[id number]] Collected: 06/25/22 0834    Order Status: Completed Specimen: Fluid Updated: 06/25/22 1035     Special Requests: NO SPECIAL REQUESTS        GRAM STAIN RARE WBCS SEEN         NO ORGANISMS SEEN        Culture result: PENDING    CULTURE, TISSUE W Brett Lute [[id number]] Collected: 06/25/22 0830    Order Status: Canceled Specimen: Appendix     COVID-19 RAPID TEST [[id number]] Collected: 06/24/22 2250    Order Status: Completed Specimen: Nasopharyngeal Updated: 06/24/22 2327     Specimen source Nasopharyngeal        COVID-19 rapid test Not detected        Comment: Rapid Abbott ID Now       Rapid NAAT:  The specimen is NEGATIVE for SARS-CoV-2, the novel coronavirus associated with COVID-19. Negative results should be treated as presumptive and, if inconsistent with clinical signs and symptoms or necessary for patient management, should be tested with an alternative molecular assay. Negative results do not preclude SARS-CoV-2 infection and should not be used as the sole basis for patient management decisions. This test has been authorized by the FDA under an Emergency Use Authorization (EUA) for use by authorized laboratories.    Fact sheet for Healthcare Providers: ConventionUpdate.co.nz  Fact sheet for Patients: ConventionUpdate.co.nz       Methodology: Isothermal Nucleic Acid Amplification               Current Medications:  Current Facility-Administered Medications   Medication Dose Route Frequency Provider Last Rate Last Admin    sodium chloride (NS) flush 5-40 mL  5-40 mL IntraVENous Q8H Chelsie Pitts MD   10 mL at 06/25/22 1419    sodium chloride (NS) flush 5-40 mL  5-40 mL IntraVENous PRN Chelsie Pitts MD        acetaminophen (TYLENOL) tablet 650 mg  650 mg Oral Q6H Bernhard Oppenheim, MD   650 mg at 06/26/22 1623    oxyCODONE IR (ROXICODONE) tablet 10 mg  10 mg Oral Q6H PRN Bernhard Oppenheim, MD        HYDROmorphone (DILAUDID) injection 1 mg  1 mg IntraVENous Q4H PRN Bernhard Oppenheim, MD   1 mg at 06/26/22 0748    naloxone (NARCAN) injection 0.4 mg  0.4 mg IntraVENous PRN Bernhard Oppenheim, MD        ondansetron [facility]) injection 4 mg  4 mg IntraVENous Q4H PRN Bernhard Oppenheim, MD        diphenhydrAMINE (BENADRYL) injection 12.5 mg  12.5 mg IntraVENous Q4H PRN Bernhard Oppenheim, MD        enoxaparin (LOVENOX) injection 40 mg  40 mg SubCUTAneous Q24H Bernhard Oppenheim, MD   40 mg at 06/25/22 1829    piperacillin-tazobactam (ZOSYN) 3.375 g in 0.9% sodium chloride (MBP/ADV) 100 mL MBP  3.375 g IntraVENous Q8H Bernhard Oppenheim, MD 25 mL/hr at 06/26/22 0236 3.375 g at 06/26/22 0236       Chart and notes reviewed. Data reviewed. I have evaluated and examined the patient. IMPRESSION:   · Patient slowly improving postop day 1 from laparoscopic appendectomy for acute perforated appendicitis with abscess. PLAN:/DISCUSION:   · Continue IV Zosyn  · Encourage incentive spirometry usage, out of bed to chair, ambulate in hallway. · IV fluid  · Monitor urine output with Sow catheter.   · SCDs to bilateral lower extremities with Lovenox 40 mg subcu daily for DVT prophylaxis        Huma Watson MD

## 2022-06-26 NOTE — PROGRESS NOTES
Bedside and Verbal shift change report given to [address] [place] [address],[address] (oncoming nurse) by Franco Paz RN (offgoing nurse). Report included the following information SBAR, Kardex, Intake/Output and MAR.

## 2022-06-27 LAB
ANION GAP SERPL CALC-SCNC: 5 MMOL/L (ref 3–18)
ANION GAP SERPL CALC-SCNC: 6 MMOL/L (ref 3–18)
BACTERIA SPEC CULT: NORMAL
BASOPHILS # BLD: 0.1 K/UL (ref 0–0.1)
BASOPHILS # BLD: 0.1 K/UL (ref 0–0.1)
BASOPHILS NFR BLD: 0 % (ref 0–2)
BASOPHILS NFR BLD: 0 % (ref 0–2)
BUN SERPL-MCNC: 15 MG/DL (ref 7–18)
BUN SERPL-MCNC: 15 MG/DL (ref 7–18)
BUN/CREAT SERPL: 16 (ref 12–20)
BUN/CREAT SERPL: 16 (ref 12–20)
CALCIUM SERPL-MCNC: 7.9 MG/DL (ref 8.5–10.1)
CALCIUM SERPL-MCNC: 8.3 MG/DL (ref 8.5–10.1)
CHLORIDE SERPL-SCNC: 96 MMOL/L (ref 100–111)
CHLORIDE SERPL-SCNC: 98 MMOL/L (ref 100–111)
CO2 SERPL-SCNC: 30 MMOL/L (ref 21–32)
CO2 SERPL-SCNC: 31 MMOL/L (ref 21–32)
CREAT SERPL-MCNC: 0.94 MG/DL (ref 0.6–1.3)
CREAT SERPL-MCNC: 0.96 MG/DL (ref 0.6–1.3)
DIFFERENTIAL METHOD BLD: ABNORMAL
DIFFERENTIAL METHOD BLD: ABNORMAL
EOSINOPHIL # BLD: 0 K/UL (ref 0–0.4)
EOSINOPHIL # BLD: 0.1 K/UL (ref 0–0.4)
EOSINOPHIL NFR BLD: 0 % (ref 0–5)
EOSINOPHIL NFR BLD: 0 % (ref 0–5)
ERYTHROCYTE [DISTWIDTH] IN BLOOD BY AUTOMATED COUNT: 13.6 % (ref 11.6–14.5)
ERYTHROCYTE [DISTWIDTH] IN BLOOD BY AUTOMATED COUNT: 13.6 % (ref 11.6–14.5)
GLUCOSE SERPL-MCNC: 101 MG/DL (ref 74–99)
GLUCOSE SERPL-MCNC: 124 MG/DL (ref 74–99)
HCT VFR BLD AUTO: 36 % (ref 36–48)
HCT VFR BLD AUTO: 39.8 % (ref 36–48)
HGB BLD-MCNC: 12 G/DL (ref 13–16)
HGB BLD-MCNC: 13 G/DL (ref 13–16)
IMM GRANULOCYTES # BLD AUTO: 0.8 K/UL (ref 0–0.04)
IMM GRANULOCYTES # BLD AUTO: 1 K/UL (ref 0–0.04)
IMM GRANULOCYTES NFR BLD AUTO: 5 % (ref 0–0.5)
IMM GRANULOCYTES NFR BLD AUTO: 5 % (ref 0–0.5)
LYMPHOCYTES # BLD: 0.7 K/UL (ref 0.9–3.6)
LYMPHOCYTES # BLD: 0.9 K/UL (ref 0.9–3.6)
LYMPHOCYTES NFR BLD: 4 % (ref 21–52)
LYMPHOCYTES NFR BLD: 4 % (ref 21–52)
MCH RBC QN AUTO: 27 PG (ref 24–34)
MCH RBC QN AUTO: 27.5 PG (ref 24–34)
MCHC RBC AUTO-ENTMCNC: 32.7 G/DL (ref 31–37)
MCHC RBC AUTO-ENTMCNC: 33.3 G/DL (ref 31–37)
MCV RBC AUTO: 82.4 FL (ref 78–100)
MCV RBC AUTO: 82.7 FL (ref 78–100)
MONOCYTES # BLD: 1.2 K/UL (ref 0.05–1.2)
MONOCYTES # BLD: 1.2 K/UL (ref 0.05–1.2)
MONOCYTES NFR BLD: 6 % (ref 3–10)
MONOCYTES NFR BLD: 7 % (ref 3–10)
NEUTS SEG # BLD: 15.3 K/UL (ref 1.8–8)
NEUTS SEG # BLD: 17.5 K/UL (ref 1.8–8)
NEUTS SEG NFR BLD: 85 % (ref 40–73)
NEUTS SEG NFR BLD: 85 % (ref 40–73)
NRBC # BLD: 0 K/UL (ref 0–0.01)
NRBC # BLD: 0 K/UL (ref 0–0.01)
NRBC BLD-RTO: 0 PER 100 WBC
NRBC BLD-RTO: 0 PER 100 WBC
PLATELET # BLD AUTO: 231 K/UL (ref 135–420)
PLATELET # BLD AUTO: 255 K/UL (ref 135–420)
PMV BLD AUTO: 9.5 FL (ref 9.2–11.8)
PMV BLD AUTO: 9.8 FL (ref 9.2–11.8)
POTASSIUM SERPL-SCNC: 3.4 MMOL/L (ref 3.5–5.5)
POTASSIUM SERPL-SCNC: 3.7 MMOL/L (ref 3.5–5.5)
RBC # BLD AUTO: 4.37 M/UL (ref 4.35–5.65)
RBC # BLD AUTO: 4.81 M/UL (ref 4.35–5.65)
SERVICE CMNT-IMP: NORMAL
SODIUM SERPL-SCNC: 133 MMOL/L (ref 136–145)
SODIUM SERPL-SCNC: 133 MMOL/L (ref 136–145)
WBC # BLD AUTO: 18 K/UL (ref 4.6–13.2)
WBC # BLD AUTO: 20.8 K/UL (ref 4.6–13.2)

## 2022-06-27 PROCEDURE — 80048 BASIC METABOLIC PNL TOTAL CA: CPT

## 2022-06-27 PROCEDURE — 36415 COLL VENOUS BLD VENIPUNCTURE: CPT

## 2022-06-27 PROCEDURE — 74011000250 HC RX REV CODE- 250: Performed by: SURGERY

## 2022-06-27 PROCEDURE — 65270000029 HC RM PRIVATE

## 2022-06-27 PROCEDURE — 74011250636 HC RX REV CODE- 250/636: Performed by: SURGERY

## 2022-06-27 PROCEDURE — 85025 COMPLETE CBC W/AUTO DIFF WBC: CPT

## 2022-06-27 PROCEDURE — 74011250637 HC RX REV CODE- 250/637: Performed by: SURGERY

## 2022-06-27 PROCEDURE — 74011000258 HC RX REV CODE- 258: Performed by: SURGERY

## 2022-06-27 RX ADMIN — ONDANSETRON 4 MG: 2 INJECTION INTRAMUSCULAR; INTRAVENOUS at 20:58

## 2022-06-27 RX ADMIN — SODIUM CHLORIDE, PRESERVATIVE FREE 10 ML: 5 INJECTION INTRAVENOUS at 20:49

## 2022-06-27 RX ADMIN — OXYCODONE HYDROCHLORIDE 10 MG: 10 TABLET ORAL at 23:14

## 2022-06-27 RX ADMIN — HYDROMORPHONE HYDROCHLORIDE 1 MG: 1 INJECTION, SOLUTION INTRAMUSCULAR; INTRAVENOUS; SUBCUTANEOUS at 06:29

## 2022-06-27 RX ADMIN — PIPERACILLIN AND TAZOBACTAM 3.38 G: 3; .375 INJECTION, POWDER, LYOPHILIZED, FOR SOLUTION INTRAVENOUS at 03:25

## 2022-06-27 RX ADMIN — PIPERACILLIN AND TAZOBACTAM 3.38 G: 3; .375 INJECTION, POWDER, LYOPHILIZED, FOR SOLUTION INTRAVENOUS at 17:42

## 2022-06-27 RX ADMIN — ACETAMINOPHEN 650 MG: 325 TABLET ORAL at 20:48

## 2022-06-27 RX ADMIN — SODIUM CHLORIDE, PRESERVATIVE FREE 10 ML: 5 INJECTION INTRAVENOUS at 14:33

## 2022-06-27 RX ADMIN — ACETAMINOPHEN 650 MG: 325 TABLET ORAL at 05:40

## 2022-06-27 RX ADMIN — OXYCODONE HYDROCHLORIDE 10 MG: 10 TABLET ORAL at 17:48

## 2022-06-27 RX ADMIN — ONDANSETRON 4 MG: 2 INJECTION INTRAMUSCULAR; INTRAVENOUS at 01:39

## 2022-06-27 RX ADMIN — ACETAMINOPHEN 650 MG: 325 TABLET ORAL at 11:35

## 2022-06-27 RX ADMIN — POTASSIUM CHLORIDE: 2 INJECTION, SOLUTION, CONCENTRATE INTRAVENOUS at 11:36

## 2022-06-27 RX ADMIN — PIPERACILLIN AND TAZOBACTAM 3.38 G: 3; .375 INJECTION, POWDER, LYOPHILIZED, FOR SOLUTION INTRAVENOUS at 11:36

## 2022-06-27 RX ADMIN — ENOXAPARIN SODIUM 40 MG: 100 INJECTION SUBCUTANEOUS at 17:40

## 2022-06-27 RX ADMIN — HYDROMORPHONE HYDROCHLORIDE 1 MG: 1 INJECTION, SOLUTION INTRAMUSCULAR; INTRAVENOUS; SUBCUTANEOUS at 01:38

## 2022-06-27 RX ADMIN — ONDANSETRON 4 MG: 2 INJECTION INTRAMUSCULAR; INTRAVENOUS at 06:29

## 2022-06-27 RX ADMIN — SODIUM CHLORIDE, PRESERVATIVE FREE 10 ML: 5 INJECTION INTRAVENOUS at 23:30

## 2022-06-27 NOTE — PROGRESS NOTES
Pt medicated with scheduled Tylenol. No pain reported. Pt was encouraged to drink fluids. No urine as of yet.

## 2022-06-27 NOTE — PROGRESS NOTES
Pt is very uncomfortable. Feels full and bloated and pain has increased.  Medicated with PRN Oxycodone 10mg

## 2022-06-27 NOTE — PROGRESS NOTES
Maxwell removed, pt tolerated it well. 100cc of jackson color urine noted in maxwell bag. .  Pt wiped off with wipes and given a urinal to urinate

## 2022-06-27 NOTE — PROGRESS NOTES
06/27/22 1738   Drain 19 fr. j p drain 06/25/22 Anterior Abdomen   Placement Date/Time: 06/25/22 0830   Description (optional): 19 fr. j p drain  Inserted By: Eleni  Present on Admission/Arrival: No  Orientation: Anterior  Location: Abdomen   Site Assessment Clean, dry, & intact   Dressing Status Clean, dry, & intact   Status Draining   Drainage Description Clear   Intake (ml) 0 ml   Output (ml) 5 ml

## 2022-06-27 NOTE — PROGRESS NOTES
Pt is alert up ambulating through the hallway independently. No complaints of pain or discomfort. No drainage in sowmya drain. Minimum drainage throughout the night from sowmya drain. Sow will be discontinued after pt is done with ambulating. No concerns at this time.

## 2022-06-27 NOTE — ADT AUTH CERT NOTES
Traic Irene MD    Admitting  Phone: [phone]   Admitting Dr. Filipe Kenney: [phone]      [facility] [address] [phone]

## 2022-06-27 NOTE — PROGRESS NOTES
Problem: Pain  Goal: *Control of Pain  Outcome: Progressing Towards Goal     Problem: Patient Education: Go to Patient Education Activity  Goal: Patient/Family Education  Outcome: Progressing Towards Goal     Problem: Patient Education: Go to Patient Education Activity  Goal: Patient/Family Education  Outcome: Progressing Towards Goal     Problem: Falls - Risk of  Goal: *Absence of Falls  Description: Document Conrad Bella Fall Risk and appropriate interventions in the flowsheet.   Outcome: Progressing Towards Goal  Note: Fall Risk Interventions:  Mobility Interventions: Patient to call before getting OOB         Medication Interventions: Patient to call before getting OOB    Elimination Interventions: Patient to call for help with toileting needs

## 2022-06-27 NOTE — ADT AUTH CERT NOTES
Louisa Leigh MD    Admitting  Phone: [phone]   Admitting [facility]: [phone]      [facility] [address] [phone]

## 2022-06-27 NOTE — PROGRESS NOTES
Bedside and Verbal shift change report given to Geovani Wong RN (oncoming nurse) by Shiloh Deleon RN (offgoing nurse). Report included the following information SBAR, Kardex, Intake/Output and MAR.

## 2022-06-28 ENCOUNTER — APPOINTMENT (OUTPATIENT)
Dept: CT IMAGING | Age: 42
DRG: 853 | End: 2022-06-28
Attending: SURGERY
Payer: COMMERCIAL

## 2022-06-28 LAB
ANION GAP SERPL CALC-SCNC: 3 MMOL/L (ref 3–18)
BACTERIA SPEC CULT: ABNORMAL
BACTERIA SPEC CULT: ABNORMAL
BUN SERPL-MCNC: 13 MG/DL (ref 7–18)
BUN/CREAT SERPL: 15 (ref 12–20)
CALCIUM SERPL-MCNC: 7.8 MG/DL (ref 8.5–10.1)
CHLORIDE SERPL-SCNC: 100 MMOL/L (ref 100–111)
CO2 SERPL-SCNC: 30 MMOL/L (ref 21–32)
CREAT SERPL-MCNC: 0.88 MG/DL (ref 0.6–1.3)
GLUCOSE SERPL-MCNC: 102 MG/DL (ref 74–99)
GRAM STN SPEC: ABNORMAL
GRAM STN SPEC: ABNORMAL
POTASSIUM SERPL-SCNC: 3.2 MMOL/L (ref 3.5–5.5)
SERVICE CMNT-IMP: ABNORMAL
SODIUM SERPL-SCNC: 133 MMOL/L (ref 136–145)

## 2022-06-28 PROCEDURE — 74011000258 HC RX REV CODE- 258: Performed by: SURGERY

## 2022-06-28 PROCEDURE — 74011000258 HC RX REV CODE- 258: Performed by: INTERNAL MEDICINE

## 2022-06-28 PROCEDURE — 74011000250 HC RX REV CODE- 250: Performed by: SURGERY

## 2022-06-28 PROCEDURE — 74011250637 HC RX REV CODE- 250/637: Performed by: SURGERY

## 2022-06-28 PROCEDURE — 36415 COLL VENOUS BLD VENIPUNCTURE: CPT

## 2022-06-28 PROCEDURE — 74011250636 HC RX REV CODE- 250/636: Performed by: SURGERY

## 2022-06-28 PROCEDURE — 74011250636 HC RX REV CODE- 250/636: Performed by: INTERNAL MEDICINE

## 2022-06-28 PROCEDURE — 74177 CT ABD & PELVIS W/CONTRAST: CPT

## 2022-06-28 PROCEDURE — 65270000029 HC RM PRIVATE

## 2022-06-28 PROCEDURE — 74011000636 HC RX REV CODE- 636: Performed by: SURGERY

## 2022-06-28 PROCEDURE — 80048 BASIC METABOLIC PNL TOTAL CA: CPT

## 2022-06-28 RX ORDER — LEVOFLOXACIN 5 MG/ML
500 INJECTION, SOLUTION INTRAVENOUS EVERY 24 HOURS
Status: DISCONTINUED | OUTPATIENT
Start: 2022-06-28 | End: 2022-07-02 | Stop reason: HOSPADM

## 2022-06-28 RX ORDER — HYDROMORPHONE HYDROCHLORIDE 1 MG/ML
1 INJECTION, SOLUTION INTRAMUSCULAR; INTRAVENOUS; SUBCUTANEOUS ONCE
Status: COMPLETED | OUTPATIENT
Start: 2022-06-28 | End: 2022-06-28

## 2022-06-28 RX ORDER — KETOROLAC TROMETHAMINE 15 MG/ML
15 INJECTION, SOLUTION INTRAMUSCULAR; INTRAVENOUS EVERY 8 HOURS
Status: DISCONTINUED | OUTPATIENT
Start: 2022-06-28 | End: 2022-07-02 | Stop reason: HOSPADM

## 2022-06-28 RX ORDER — KETOROLAC TROMETHAMINE 15 MG/ML
15 INJECTION, SOLUTION INTRAMUSCULAR; INTRAVENOUS ONCE
Status: COMPLETED | OUTPATIENT
Start: 2022-06-28 | End: 2022-06-28

## 2022-06-28 RX ADMIN — DIATRIZOATE MEGLUMINE AND DIATRIZOATE SODIUM 30 ML: 660; 100 LIQUID ORAL; RECTAL at 13:40

## 2022-06-28 RX ADMIN — OXYCODONE HYDROCHLORIDE 10 MG: 10 TABLET ORAL at 06:15

## 2022-06-28 RX ADMIN — PIPERACILLIN AND TAZOBACTAM 3.38 G: 3; .375 INJECTION, POWDER, LYOPHILIZED, FOR SOLUTION INTRAVENOUS at 03:30

## 2022-06-28 RX ADMIN — KETOROLAC TROMETHAMINE 15 MG: 15 INJECTION, SOLUTION INTRAMUSCULAR; INTRAVENOUS at 23:49

## 2022-06-28 RX ADMIN — KETOROLAC TROMETHAMINE 15 MG: 15 INJECTION, SOLUTION INTRAMUSCULAR; INTRAVENOUS at 13:41

## 2022-06-28 RX ADMIN — SODIUM CHLORIDE, PRESERVATIVE FREE 10 ML: 5 INJECTION INTRAVENOUS at 23:50

## 2022-06-28 RX ADMIN — IOPAMIDOL 100 ML: 612 INJECTION, SOLUTION INTRAVENOUS at 16:18

## 2022-06-28 RX ADMIN — ACETAMINOPHEN 650 MG: 325 TABLET ORAL at 03:29

## 2022-06-28 RX ADMIN — HYDROMORPHONE HYDROCHLORIDE 1 MG: 1 INJECTION, SOLUTION INTRAMUSCULAR; INTRAVENOUS; SUBCUTANEOUS at 01:13

## 2022-06-28 RX ADMIN — PIPERACILLIN AND TAZOBACTAM 4.5 G: 4; .5 INJECTION, POWDER, FOR SOLUTION INTRAVENOUS at 20:54

## 2022-06-28 RX ADMIN — ACETAMINOPHEN 650 MG: 325 TABLET ORAL at 23:50

## 2022-06-28 RX ADMIN — POTASSIUM CHLORIDE: 2 INJECTION, SOLUTION, CONCENTRATE INTRAVENOUS at 06:17

## 2022-06-28 RX ADMIN — KETOROLAC TROMETHAMINE 15 MG: 15 INJECTION, SOLUTION INTRAMUSCULAR; INTRAVENOUS at 01:13

## 2022-06-28 RX ADMIN — ACETAMINOPHEN 650 MG: 325 TABLET ORAL at 08:31

## 2022-06-28 RX ADMIN — VANCOMYCIN HYDROCHLORIDE 1000 MG: 1 INJECTION, POWDER, LYOPHILIZED, FOR SOLUTION INTRAVENOUS at 08:31

## 2022-06-28 RX ADMIN — KETOROLAC TROMETHAMINE 15 MG: 15 INJECTION, SOLUTION INTRAMUSCULAR; INTRAVENOUS at 08:32

## 2022-06-28 RX ADMIN — POTASSIUM CHLORIDE: 2 INJECTION, SOLUTION, CONCENTRATE INTRAVENOUS at 23:57

## 2022-06-28 RX ADMIN — ENOXAPARIN SODIUM 40 MG: 100 INJECTION SUBCUTANEOUS at 15:00

## 2022-06-28 RX ADMIN — SODIUM CHLORIDE, PRESERVATIVE FREE 10 ML: 5 INJECTION INTRAVENOUS at 06:17

## 2022-06-28 RX ADMIN — SODIUM CHLORIDE, PRESERVATIVE FREE 10 ML: 5 INJECTION INTRAVENOUS at 13:40

## 2022-06-28 RX ADMIN — ACETAMINOPHEN 650 MG: 325 TABLET ORAL at 15:00

## 2022-06-28 RX ADMIN — PIPERACILLIN AND TAZOBACTAM 3.38 G: 3; .375 INJECTION, POWDER, LYOPHILIZED, FOR SOLUTION INTRAVENOUS at 11:48

## 2022-06-28 NOTE — PROGRESS NOTES
Problem: Pain  Goal: *Control of Pain  Outcome: Progressing Towards Goal     Problem: Patient Education: Go to Patient Education Activity  Goal: Patient/Family Education  Outcome: Progressing Towards Goal     Problem: Falls - Risk of  Goal: *Absence of Falls  Description: Document Maldonadoeliza Weinberg Fall Risk and appropriate interventions in the flowsheet.   Outcome: Progressing Towards Goal  Note: Fall Risk Interventions:  Mobility Interventions: Assess mobility with egress test,Patient to call before getting OOB         Medication Interventions: Evaluate medications/consider consulting pharmacy,Patient to call before getting OOB    Elimination Interventions: Call light in reach,Patient to call for help with toileting needs              Problem: Surgical Pathway Day of Surgery  Goal: Consults, if ordered  Outcome: Progressing Towards Goal     Problem: Patient Education: Go to Patient Education Activity  Goal: Patient/Family Education  Outcome: Progressing Towards Goal

## 2022-06-28 NOTE — CONSULTS
Infectious Disease Consultation Note        Reason: Perforated appendicitis, abdominal abscess, worsening leukocytosis    Current abx Prior abx   Pip/tazo, vancomycin since 6/24  Vancomycin since 6/28      Lines:       Assessment :    68-year-old male with no significant past medical history presented to the emergency department on 6/24/2022 with complaints of worsening right lower quadrant pain. Ct abd/pelvis without contrast 6/21-Colitis from the cecal tip to the distal transverse colon. Mild  inflammation/edema at the level of the cecum. Ct abd/pelvis with contrast 6/24- Acute appendicitis with evidence of perforation at the tip. No  definable/measurable discrete abscess. Clinical presentation consistent with acute perforated appendicitis with abscess status post laparoscopic appendectomy on 6/25/2022    Intra-Op cultures 6/25-Klebsiella (cefazolin SUSAN 16, ampicillin/sulbactam SUSAN 8, resistant to ampicillin), alpha Streptococcus    Worsening leukocytosis likely combination of postoperative inflammation, partially treated infection due to evolving beta-lactam resistance. Susceptibility pattern of Klebsiella concerning for this. We will modify antibiotics to cover for this possibility. Await CT findings to rule out any other undiagnosed abdominal pathology    Recommendations:    1. Continue Zosyn. Increase dose to 4.5 g IV every 8 hours. Discontinue vancomycin. Start levofloxacin  2. Follow-up results of CT scan 6/28  3. Follow-up surgery recommendations regarding abdominal drain  4. Advance diet per surgery  5. Monitor CBC, temperature, clinically    Thank you for consultation request. Above plan was discussed in details with patient, family. Please call me if any further questions or concerns. Will continue to participate in the care of this patient.   HPI:    68-year-old male with no significant past medical history presented to the emergency department on 6/24/2022 with complaints of worsening right lower quadrant pain. He had been seen in the emergency department 2 days prior to admission I.e on 6/21/22 with similar complaints. His work-up included a CT abdomen pelvis which was read as colitis. The patient was discharged home on oral augmentin. The symptoms continue to worsen. Came back to the emergency room on 6/24/2022. He had a repeat CT which revealed acute appendicitis with small perforation without abscess. That is post laparoscopic appendectomy, abscess drainage on 6/25/2022. Patient noted to have worsening leukocytosis postoperatively. Had been on piperacillin/tazobactam since admission. Intra-Op cultures positive for Klebsiella, alpha strep. Vancomycin was added to his regimen today. I have been consulted for further recommendations. Patient states that his abdominal pain is gradually improving since surgery. He denies any nausea, vomiting. Denies subjective fever, chills. No prior history of MRSA infection or colonization. No history of recurrent infection or frequent antibiotic use         Past Medical History:   Diagnosis Date    S/P appy 06/25/2022    acute ruptured       No past surgical history on file. Home Medication List    Details   amoxicillin-clavulanate (Augmentin) 875-125 mg per tablet Take 1 Tablet by mouth two (2) times a day for 10 days. Qty: 20 Tablet, Refills: 0      dicyclomine (BENTYL) 10 mg capsule Take 1 Capsule by mouth two (2) times a day. Qty: 14 Capsule, Refills: 0      ondansetron hcl (Zofran) 4 mg tablet Take 1 Tablet by mouth every eight (8) hours as needed for Nausea.   Qty: 14 Tablet, Refills: 0          Current Facility-Administered Medications   Medication Dose Route Frequency    vancomycin (VANCOCIN) 1,000 mg in 0.9% sodium chloride 250 mL (VIAL-MATE)  1,000 mg IntraVENous Q12H    ketorolac (TORADOL) injection 15 mg  15 mg IntraVENous Q8H    dextrose 5% and 0.9% NaCl 1,000 mL with potassium chloride 20 mEq infusion IntraVENous CONTINUOUS    sodium chloride (NS) flush 5-40 mL  5-40 mL IntraVENous Q8H    sodium chloride (NS) flush 5-40 mL  5-40 mL IntraVENous PRN    acetaminophen (TYLENOL) tablet 650 mg  650 mg Oral Q6H    oxyCODONE IR (ROXICODONE) tablet 10 mg  10 mg Oral Q6H PRN    naloxone (NARCAN) injection 0.4 mg  0.4 mg IntraVENous PRN    ondansetron (ZOFRAN) injection 4 mg  4 mg IntraVENous Q4H PRN    diphenhydrAMINE (BENADRYL) injection 12.5 mg  12.5 mg IntraVENous Q4H PRN    enoxaparin (LOVENOX) injection 40 mg  40 mg SubCUTAneous Q24H    piperacillin-tazobactam (ZOSYN) 3.375 g in 0.9% sodium chloride (MBP/ADV) 100 mL MBP  3.375 g IntraVENous Q8H       Allergies: Patient has no known allergies. No family history on file. Social History     Socioeconomic History    Marital status: SINGLE     Spouse name: Not on file    Number of children: Not on file    Years of education: Not on file    Highest education level: Not on file   Occupational History    Not on file   Tobacco Use    Smoking status: Not on file    Smokeless tobacco: Not on file   Substance and Sexual Activity    Alcohol use: Not on file    Drug use: Not on file    Sexual activity: Not on file   Other Topics Concern    Not on file   Social History Narrative    Not on file     Social Determinants of Health     Financial Resource Strain:     Difficulty of Paying Living Expenses: Not on file   Food Insecurity:     Worried About Running Out of Food in the Last Year: Not on file    Frank of Food in the Last Year: Not on file   Transportation Needs:     Lack of Transportation (Medical): Not on file    Lack of Transportation (Non-Medical):  Not on file   Physical Activity:     Days of Exercise per Week: Not on file    Minutes of Exercise per Session: Not on file   Stress:     Feeling of Stress : Not on file   Social Connections:     Frequency of Communication with Friends and Family: Not on file    Frequency of Social Gatherings with Friends and Family: Not on file    Attends Je[employer] witness Services: Not on file    Active Member of Clubs or Organizations: Not on file    Attends Club or Organization Meetings: Not on file    Marital Status: Not on file   Intimate Partner Violence:     Fear of Current or Ex-Partner: Not on file    Emotionally Abused: Not on file    Physically Abused: Not on file    Sexually Abused: Not on file   Housing Stability:     Unable to Pay for Housing in the Last Year: Not on file    Number of [place] in the Last Year: Not on file    Unstable Housing in the Last Year: Not on file     Social History     Tobacco Use   Smoking Status Not on file   Smokeless Tobacco Not on file        Temp (24hrs), Av.1 °F (36.7 °C), Min:96.9 °F (36.1 °C), Max:99 °F (37.2 °C)    Visit Vitals  /62 (BP 1 Location: Left upper arm, BP Patient Position: At rest)   Pulse 81   Temp 96.9 °F (36.1 °C)   Resp 16   Wt 81.3 kg (179 lb 3.2 oz)   SpO2 96%   BMI 24.30 kg/m²       ROS: 12 point ROS obtained in details. Pertinent positives as mentioned in HPI,   otherwise negative    Physical Exam:    General: Well developed, well nourished male laying on the bed AAOx3 in no acute distress. General:   awake alert and oriented   HEENT:  Normocephalic, atraumatic, EOMI, no scleral icterus or pallor; no conjunctival hemmohage;  nasal and oral mucous are moist and without evidence of lesions. Neck supple, no bruits. Lymph Nodes:   no cervical, axillary or inguinal adenopathy   Lungs:   non-labored, bilateral chest movements equal   Heart:  RRR, s1 and s2; no rubs or gallops, no edema   Abdomen:  soft, distended, active bowel sounds, no hepatomegaly, no splenomegaly, lower abdominal tenderness-maximal right lower quadrant-no guarding, rigidity   Genitourinary:  deferred   Extremities:   no clubbing, cyanosis; no joint effusions or swelling;  Full ROM of all large joints to the upper and lower extremities; muscle mass appropriate for age   Neurologic:  No gross focal sensory abnormalities; 5/5 muscle strength to upper and lower extremities. Speech appropriate. Cranial nerves intact                        Skin:  No rash or ulcers noted   Back:  no spinal or paraspinal muscle tenderness or rigidity, no CVA tenderness     Psychiatric:  No suicidal or homicidal ideations, appropriate mood and affect         Labs: Results:   Chemistry Recent Labs     06/28/22  0945 06/27/22  0832 06/27/22  0307   * 101* 124*   * 133* 133*   K 3.2* 3.7 3.4*    96* 98*   CO2 30 31 30   BUN 13 15 15   CREA 0.88 0.94 0.96   CA 7.8* 8.3* 7.9*   AGAP 3 6 5   BUCR 15 16 16      CBC w/Diff Recent Labs     06/27/22  0832 06/27/22  0307 06/26/22  0350   WBC 20.8* 18.0* 14.2*   RBC 4.81 4.37 3.95*   HGB 13.0 12.0* 10.7*   HCT 39.8 36.0 32.6*    231 190   GRANS 85* 85* 80*   LYMPH 4* 4* 3*   EOS 0 0 0      Microbiology No results for input(s): CULT in the last 72 hours. RADIOLOGY:    All available imaging studies/reports in [facility] care for this admission were reviewed      Disclaimer: Sections of this note are dictated utilizing voice recognition software, which may have resulted in some phonetic based errors in grammar and contents. Even though attempts were made to correct all the mistakes, some may have been missed, and remained in the body of the document. If questions arise, please contact our department.     Dr. Chelly Lawrence, Infectious Disease Specialist  [phone]  June 28, 2022  4:00 PM

## 2022-06-28 NOTE — ROUTINE PROCESS
2000- Assessment completed. Patient lying in bed. Visitor in room. Patient quiet and withdrawn. Guarding abdomen. ROSEANNE drain with serous drainage noted. Lap sites are dry and intact. IV fluids continue at 75 ml/hr via IV pump. Zosyn infusing at 25 ml/hr via IV pump. Call bell in reach . side rails up x 2.  2048- patient had refused tylenol earlier. Agreed to take tylenol at this time. Patient complaining of pain to area around ROSEANNE drain. 2058- patient complaining of nausea. zofran 4 mg given slow IV push. 2200- no complaints of nausea at present time. 2314 - patient complaining of severe pain to lower abdomen. roxicodone 10 mg given PO for pain. See pain flow sheet. 1674- Patient up in chair. Reporting that he is so uncomfortable that he cannot sleep in the bed.  1 [facility]  [address] called . Orders received for toradol and dilaudid. 0113- dilaudid 1  Mg given slow IV push for pain to lower abdomen. toradol 15 mg given IV push for pain. See pain flowsheet. 0210- pain reassessment completed. See pain flow sheet. 1678- scheduled tylenol given PO per MD order. Zosyn extended started via IV pump. 0430- vital signs completed. 0615- Roxicodone 10 mg given PO for pain to lower abdomen. See pain flow sheet. New bag of IV fluids started. 1719- Patient walking in room. Patient reports that he slept very well and had a good night. Patient smiling and talkative.

## 2022-06-28 NOTE — PROGRESS NOTES
Kevin Aviles M.D. FACS  PROGRESS NOTE    Name: Indu Colon MRN: [id number]   : 1980 Hospital: [facility]   Date: 2022 Admission Date: 2022  8:01 PM     Hospital Day: 5  3 Days Post-Op  Subjective:  Patient with abdominal pain overnight which responded to Toradol and Dilaudid. Low-grade fever and persistent white count-20,000  Objective:  Vitals:    22 1512 22 1930 22 2305 22 0430   BP: 120/78 127/74 127/79 113/68   Pulse: 86 73 80 77   Resp: 16 16 16 16   Temp: 99.1 °F (37.3 °C) 98.9 °F (37.2 °C) 99 °F (37.2 °C) 98.1 °F (36.7 °C)   SpO2: 97% 94% 98% 98%     Date 22 0700 - 22 0659 22 0700 - 22 0659   Shift 8620-5060 2031-9227 24 Hour Total 1386-1183 3809-3517 24 Hour Total   INTAKE   P.O. 200 360 560        P.O. 200 360 560      I.V. 200 492.5 692.5        Volume (dextrose 5% and 0.9% NaCl 1,000 mL with potassium chloride 20 mEq infusion)  392.5 392.5        Volume (piperacillin-tazobactam (ZOSYN) 3.375 g in 0.9% sodium chloride (MBP/ADV) 100 mL MBP) 200 100 300      NG/GT 0 0 0        Intake (ml) (Drain 19 fr. j p drain 22 Anterior Abdomen) 0 0 0      Shift Total 400 852.5 1252.5      OUTPUT   Urine 325 350 675        Urine Voided 325 350 675      Drains 5 5 10        Output (ml) (Drain 19 fr. j p drain 22 Anterior Abdomen) 5 5 10      Shift Total 330 355 685      NET 70 497.5 567.5      Weight (kg)               Physical Exam:    General: Awake and alert, no apparent distress   Abdomen: abdomen is soft with minimal incisional and ROSEANNE site tenderness. Incision(s) are C/D/I.  ROSEANNE drain with serosanguineous drainage.   No   masses, organomegaly or guarding   Extremities: No c/c/e BLE  Labs:  Recent Results (from the past 24 hour(s))   METABOLIC PANEL, BASIC    Collection Time: 22  8:32 AM   Result Value Ref Range    Sodium 133 (L) 136 - 145 mmol/L    Potassium 3.7 3.5 - 5.5 mmol/L    Chloride 96 (L) 100 - 111 mmol/L    CO2 31 21 - 32 mmol/L    Anion gap 6 3.0 - 18 mmol/L    Glucose 101 (H) 74 - 99 mg/dL    BUN 15 7.0 - 18 MG/DL    Creatinine 0.94 0.6 - 1.3 MG/DL    BUN/Creatinine ratio 16 12 - 20      GFR est AA >60 >60 ml/min/1.73m2    GFR est non-AA >60 >60 ml/min/1.73m2    Calcium 8.3 (L) 8.5 - 10.1 MG/DL   CBC WITH AUTOMATED DIFF    Collection Time: 06/27/22  8:32 AM   Result Value Ref Range    WBC 20.8 (H) 4.6 - 13.2 K/uL    RBC 4.81 4.35 - 5.65 M/uL    HGB 13.0 13.0 - 16.0 g/dL    HCT 39.8 36.0 - 48.0 %    MCV 82.7 78.0 - 100.0 FL    MCH 27.0 24.0 - 34.0 PG    MCHC 32.7 31.0 - 37.0 g/dL    RDW 13.6 11.6 - 14.5 %    PLATELET 485 773 - 057 K/uL    MPV 9.8 9.2 - 11.8 FL    NRBC 0.0 0  WBC    ABSOLUTE NRBC 0.00 0.00 - 0.01 K/uL    NEUTROPHILS 85 (H) 40 - 73 %    LYMPHOCYTES 4 (L) 21 - 52 %    MONOCYTES 6 3 - 10 %    EOSINOPHILS 0 0 - 5 %    BASOPHILS 0 0 - 2 %    IMMATURE GRANULOCYTES 5 (H) 0.0 - 0.5 %    ABS. NEUTROPHILS 17.5 (H) 1.8 - 8.0 K/UL    ABS. LYMPHOCYTES 0.9 0.9 - 3.6 K/UL    ABS. MONOCYTES 1.2 0.05 - 1.2 K/UL    ABS. EOSINOPHILS 0.1 0.0 - 0.4 K/UL    ABS. BASOPHILS 0.1 0.0 - 0.1 K/UL    ABS. IMM. GRANS.  1.0 (H) 0.00 - 0.04 K/UL    DF AUTOMATED       All Micro Results     Procedure Component Value Units Date/Time    CULTURE, BLOOD [[id number]] Collected: 06/24/22 3012    Order Status: Completed Specimen: Blood Updated: 06/28/22 3824     Special Requests: NO SPECIAL REQUESTS        Culture result: NO GROWTH 4 DAYS       CULTURE, BLOOD [[id number]] Collected: 06/24/22 2230    Order Status: Completed Specimen: Blood Updated: 06/28/22 0640     Special Requests: NO SPECIAL REQUESTS        Culture result: NO GROWTH 4 DAYS       CULTURE, ANAEROBIC [[id number]] Collected: 06/25/22 0824    Order Status: Completed Specimen: Surgical Specimen Updated: 06/27/22 1223     Special Requests: NO SPECIAL REQUESTS        Culture result: NO ANAEROBES ISOLATED       CULTURE, BODY FLUID W [address] STAIN [[id number]]  (Abnormal) Collected: 06/25/22 0834    Order Status: Completed Specimen: Fluid Updated: 06/27/22 1214     Special Requests: NO SPECIAL REQUESTS        GRAM STAIN RARE WBCS SEEN         NO ORGANISMS SEEN        Culture result: LIGHT GRAM NEGATIVE RODS               FEW POSSIBLE STREPTOCOCCUS SPECIES          CULTURE, TISSUE W Jesse Marion [[id number]] Collected: 06/25/22 0830    Order Status: Canceled Specimen: Appendix     COVID-19 RAPID TEST [[id number]] Collected: 06/24/22 2250    Order Status: Completed Specimen: Nasopharyngeal Updated: 06/24/22 2327     Specimen source Nasopharyngeal        COVID-19 rapid test Not detected        Comment: Rapid Abbott ID Now       Rapid NAAT:  The specimen is NEGATIVE for SARS-CoV-2, the novel coronavirus associated with COVID-19. Negative results should be treated as presumptive and, if inconsistent with clinical signs and symptoms or necessary for patient management, should be tested with an alternative molecular assay. Negative results do not preclude SARS-CoV-2 infection and should not be used as the sole basis for patient management decisions. This test has been authorized by the FDA under an Emergency Use Authorization (EUA) for use by authorized laboratories.    Fact sheet for Healthcare Providers: ConventionUpdate.co.nz  Fact sheet for Patients: ConventionUpdate.co.nz       Methodology: Isothermal Nucleic Acid Amplification               Current Medications:  Current Facility-Administered Medications   Medication Dose Route Frequency Provider Last Rate Last Admin    dextrose 5% and 0.9% NaCl 1,000 mL with potassium chloride 20 mEq infusion   IntraVENous CONTINUOUS Liana Garibay MD 75 mL/hr at 06/28/22 0617 New Bag at 06/28/22 0617    sodium chloride (NS) flush 5-40 mL  5-40 mL IntraVENous Q8H Liana Garibay MD   10 mL at 06/28/22 0617    sodium chloride (NS) flush 5-40 mL  5-40 mL IntraVENous PRN Liana Garibay MD  acetaminophen (TYLENOL) tablet 650 mg  650 mg Oral Q6H Uriah Covarrubias MD   650 mg at 06/28/22 0329    oxyCODONE IR (ROXICODONE) tablet 10 mg  10 mg Oral Q6H PRN Uriah Covarrubias MD   10 mg at 06/28/22 0615    naloxone [facility]) injection 0.4 mg  0.4 mg IntraVENous PRN Uriah Covarrubias MD        ondansetron [facility]) injection 4 mg  4 mg IntraVENous Q4H PRN Uriah Covarrubias MD   4 mg at 06/27/22 2058    diphenhydrAMINE (BENADRYL) injection 12.5 mg  12.5 mg IntraVENous Q4H PRN Uriah Covarrubias MD        enoxaparin (LOVENOX) injection 40 mg  40 mg SubCUTAneous Q24H Uriah Covarrubias MD   40 mg at 06/27/22 1740    piperacillin-tazobactam (ZOSYN) 3.375 g in 0.9% sodium chloride (MBP/ADV) 100 mL MBP  3.375 g IntraVENous Q8H Uriah Covarrubias MD 25 mL/hr at 06/28/22 0330 3.375 g at 06/28/22 0330       Chart and notes reviewed. Data reviewed. I have evaluated and examined the patient.         IMPRESSION:   · Patient is postop day 3 from laparoscopic appendectomy secondary to acute perforated appendicitis with abscess      PLAN:/DISCUSION:   · Add Toradol to pain control  · Add vancomycin to Zosyn  · CT abdomen pelvis to assess for any undrained abscess collections or evidence of leak from the appendiceal stump  · Out of bed to chair, encourage incentive spirometry usage  · Continue DVT prophylaxis with SCDs and subcu Lovenox                                                                                                      Reinaldo Franz MD

## 2022-06-28 NOTE — ROUTINE PROCESS
Bedside and Verbal shift change report given to Martell Currie (oncoming nurse) by Yanet Arriaza (offgoing nurse). Report included the following information SBAR, Kardex, Intake/Output, MAR, Recent Results and Cardiac Rhythm NSR.

## 2022-06-28 NOTE — PROGRESS NOTES
Bedside and Verbal shift change report given to JOSIANE Pineda (oncoming nurse) by Cathryn Lama RN (offgoing nurse). Report included the following information SBAR, Kardex, Intake/Output and MAR.

## 2022-06-28 NOTE — PROGRESS NOTES
Reason for Admission:  Ruptured appendicitis [K35.32]  Acute perforated appendicitis [K35.32]                 RUR Score:  6%           Plan for utilizing home health:    no                      Likelihood of Readmission:   LOW                         Transition of Care Plan:              Initial assessment completed with patient. Cognitive status of patient: oriented to time, place, person and situation. Face sheet information confirmed:  yes. The patient designates mom Osbaldo Acuna  to participate in his discharge plan and to receive any needed information. This patient lives in a single family home. Patient is able to navigate steps as needed. Prior to hospitalization, patient was considered to be independent with ADLs/IADLS : yes . Patient has a current ACP document on file: no      Healthcare Decision Maker: The mother will be available to transport patient home upon discharge. The patient already has none reported,  medical equipment available in the home. Patient is not currently active with home health. Patient has not stayed in a skilled nursing facility or rehab. Was  stay within last 60 days : no. This patient is on dialysis :no    Currently, the discharge plan is Home. The patient states that he can obtain his medications from the pharmacy, and take his medications as directed. Patient's current insurance is [facility] [employer]       Care Management Interventions  PCP Verified by CM: No (new to area needs PCP)  Mode of Transport at Discharge: Other (see comment) (mom)  Transition of Care Consult (CM Consult): Discharge Planning  Physical Therapy Consult: No  Occupational Therapy Consult: No  Support Systems: Parent(s)  Confirm Follow Up Transport: Family  Discharge Location  Patient Expects to be Discharged to[de-identified] Home with family assistance         will continue to monitor and assist with transition of care needs.     RUDY BirminghamN, RN  Care Management  Pager # [phone]

## 2022-06-28 NOTE — PROGRESS NOTES
Physician Progress Note      Doris Clay  CSN #:                  [id number]  :                       1980  ADMIT DATE:       2022 8:01 PM  [address] [place] DATE:  RESPONDING  PROVIDER #:        Ingrid Marquis MD          QUERY TEXT:    Pt admitted with appendicitis/abscess. Culture and sensitivity was performed on  and results indicated resistance to ampicillin. Vanc was added to Zosyn. After further review, can this patient be considered to have: The medical record reflects the following:  Risk Factors: 46 y.o. male with post-operative diagnosis: Acute perforated appendicitis with abscess  Clinical Indicators: Per  body fluid culture -  HEAVY KLEBSIELLA OXYTOCA; FEW ALPHA STREPTOCOCCUS;  Ampicillin Resistant. Treatment: Vanc was added to current Zosyn    Thank you,  Francisco Harrison RN, [email or web address]  .  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     Query created by: Penny Calvin on 2022 1:27 PM      Electronically signed by:  Ingrid Marquis MD 2022 3:49 PM

## 2022-06-29 LAB
ANION GAP SERPL CALC-SCNC: 7 MMOL/L (ref 3–18)
BASOPHILS # BLD: 0.1 K/UL (ref 0–0.1)
BASOPHILS NFR BLD: 1 % (ref 0–2)
BUN SERPL-MCNC: 10 MG/DL (ref 7–18)
BUN/CREAT SERPL: 11 (ref 12–20)
CALCIUM SERPL-MCNC: 8.1 MG/DL (ref 8.5–10.1)
CHLORIDE SERPL-SCNC: 99 MMOL/L (ref 100–111)
CO2 SERPL-SCNC: 30 MMOL/L (ref 21–32)
CREAT SERPL-MCNC: 0.89 MG/DL (ref 0.6–1.3)
DIFFERENTIAL METHOD BLD: ABNORMAL
EOSINOPHIL # BLD: 0.2 K/UL (ref 0–0.4)
EOSINOPHIL NFR BLD: 2 % (ref 0–5)
ERYTHROCYTE [DISTWIDTH] IN BLOOD BY AUTOMATED COUNT: 13.7 % (ref 11.6–14.5)
GLUCOSE SERPL-MCNC: 87 MG/DL (ref 74–99)
HCT VFR BLD AUTO: 35.3 % (ref 36–48)
HGB BLD-MCNC: 11.6 G/DL (ref 13–16)
IMM GRANULOCYTES # BLD AUTO: 0.8 K/UL (ref 0–0.04)
IMM GRANULOCYTES NFR BLD AUTO: 6 % (ref 0–0.5)
LYMPHOCYTES # BLD: 0.9 K/UL (ref 0.9–3.6)
LYMPHOCYTES NFR BLD: 7 % (ref 21–52)
MCH RBC QN AUTO: 27 PG (ref 24–34)
MCHC RBC AUTO-ENTMCNC: 32.9 G/DL (ref 31–37)
MCV RBC AUTO: 82.1 FL (ref 78–100)
MONOCYTES # BLD: 0.8 K/UL (ref 0.05–1.2)
MONOCYTES NFR BLD: 6 % (ref 3–10)
NEUTS SEG # BLD: 10 K/UL (ref 1.8–8)
NEUTS SEG NFR BLD: 79 % (ref 40–73)
NRBC # BLD: 0 K/UL (ref 0–0.01)
NRBC BLD-RTO: 0 PER 100 WBC
PLATELET # BLD AUTO: 279 K/UL (ref 135–420)
PMV BLD AUTO: 9.1 FL (ref 9.2–11.8)
POTASSIUM SERPL-SCNC: 3.4 MMOL/L (ref 3.5–5.5)
RBC # BLD AUTO: 4.3 M/UL (ref 4.35–5.65)
SODIUM SERPL-SCNC: 136 MMOL/L (ref 136–145)
WBC # BLD AUTO: 12.7 K/UL (ref 4.6–13.2)

## 2022-06-29 PROCEDURE — 74011250636 HC RX REV CODE- 250/636: Performed by: INTERNAL MEDICINE

## 2022-06-29 PROCEDURE — 74011000250 HC RX REV CODE- 250: Performed by: SURGERY

## 2022-06-29 PROCEDURE — 36415 COLL VENOUS BLD VENIPUNCTURE: CPT

## 2022-06-29 PROCEDURE — 80048 BASIC METABOLIC PNL TOTAL CA: CPT

## 2022-06-29 PROCEDURE — 74011250636 HC RX REV CODE- 250/636: Performed by: SURGERY

## 2022-06-29 PROCEDURE — 65270000029 HC RM PRIVATE

## 2022-06-29 PROCEDURE — 74011250637 HC RX REV CODE- 250/637: Performed by: SURGERY

## 2022-06-29 PROCEDURE — 74011000258 HC RX REV CODE- 258: Performed by: INTERNAL MEDICINE

## 2022-06-29 PROCEDURE — 85025 COMPLETE CBC W/AUTO DIFF WBC: CPT

## 2022-06-29 RX ORDER — DOCUSATE SODIUM 100 MG/1
100 CAPSULE, LIQUID FILLED ORAL 2 TIMES DAILY
Status: DISCONTINUED | OUTPATIENT
Start: 2022-06-29 | End: 2022-07-02 | Stop reason: HOSPADM

## 2022-06-29 RX ORDER — DEXTROSE, SODIUM CHLORIDE, AND POTASSIUM CHLORIDE 5; .9; .15 G/100ML; G/100ML; G/100ML
75 INJECTION INTRAVENOUS CONTINUOUS
Status: DISCONTINUED | OUTPATIENT
Start: 2022-06-29 | End: 2022-07-02 | Stop reason: HOSPADM

## 2022-06-29 RX ORDER — HYDROMORPHONE HYDROCHLORIDE 4 MG/1
4 TABLET ORAL
Status: DISCONTINUED | OUTPATIENT
Start: 2022-06-29 | End: 2022-07-02 | Stop reason: HOSPADM

## 2022-06-29 RX ORDER — FACIAL-BODY WIPES
10 EACH TOPICAL DAILY PRN
Status: DISCONTINUED | OUTPATIENT
Start: 2022-06-29 | End: 2022-07-02 | Stop reason: HOSPADM

## 2022-06-29 RX ORDER — FACIAL-BODY WIPES
10 EACH TOPICAL ONCE
Status: COMPLETED | OUTPATIENT
Start: 2022-06-29 | End: 2022-06-29

## 2022-06-29 RX ADMIN — LEVOFLOXACIN 500 MG: 500 INJECTION, SOLUTION INTRAVENOUS at 22:44

## 2022-06-29 RX ADMIN — LEVOFLOXACIN 500 MG: 500 INJECTION, SOLUTION INTRAVENOUS at 00:49

## 2022-06-29 RX ADMIN — KETOROLAC TROMETHAMINE 15 MG: 15 INJECTION, SOLUTION INTRAMUSCULAR; INTRAVENOUS at 05:49

## 2022-06-29 RX ADMIN — ENOXAPARIN SODIUM 40 MG: 100 INJECTION SUBCUTANEOUS at 17:03

## 2022-06-29 RX ADMIN — DOCUSATE SODIUM 100 MG: 100 CAPSULE, LIQUID FILLED ORAL at 13:56

## 2022-06-29 RX ADMIN — DOCUSATE SODIUM 100 MG: 100 CAPSULE, LIQUID FILLED ORAL at 17:03

## 2022-06-29 RX ADMIN — KETOROLAC TROMETHAMINE 15 MG: 15 INJECTION, SOLUTION INTRAMUSCULAR; INTRAVENOUS at 21:20

## 2022-06-29 RX ADMIN — PIPERACILLIN AND TAZOBACTAM 4.5 G: 4; .5 INJECTION, POWDER, FOR SOLUTION INTRAVENOUS at 10:14

## 2022-06-29 RX ADMIN — POTASSIUM CHLORIDE, DEXTROSE MONOHYDRATE AND SODIUM CHLORIDE 75 ML/HR: 150; 5; 900 INJECTION, SOLUTION INTRAVENOUS at 10:16

## 2022-06-29 RX ADMIN — KETOROLAC TROMETHAMINE 15 MG: 15 INJECTION, SOLUTION INTRAMUSCULAR; INTRAVENOUS at 13:56

## 2022-06-29 RX ADMIN — HYDROMORPHONE HYDROCHLORIDE 4 MG: 4 TABLET ORAL at 18:52

## 2022-06-29 RX ADMIN — SODIUM CHLORIDE, PRESERVATIVE FREE 10 ML: 5 INJECTION INTRAVENOUS at 14:05

## 2022-06-29 RX ADMIN — PIPERACILLIN AND TAZOBACTAM 4.5 G: 4; .5 INJECTION, POWDER, FOR SOLUTION INTRAVENOUS at 18:23

## 2022-06-29 RX ADMIN — SODIUM CHLORIDE, PRESERVATIVE FREE 10 ML: 5 INJECTION INTRAVENOUS at 21:21

## 2022-06-29 RX ADMIN — ACETAMINOPHEN 650 MG: 325 TABLET ORAL at 17:03

## 2022-06-29 RX ADMIN — PIPERACILLIN AND TAZOBACTAM 4.5 G: 4; .5 INJECTION, POWDER, FOR SOLUTION INTRAVENOUS at 04:21

## 2022-06-29 RX ADMIN — SODIUM CHLORIDE, PRESERVATIVE FREE 10 ML: 5 INJECTION INTRAVENOUS at 05:21

## 2022-06-29 RX ADMIN — ACETAMINOPHEN 650 MG: 325 TABLET ORAL at 04:21

## 2022-06-29 RX ADMIN — ACETAMINOPHEN 650 MG: 325 TABLET ORAL at 10:09

## 2022-06-29 RX ADMIN — BISACODYL 10 MG: 10 SUPPOSITORY RECTAL at 13:56

## 2022-06-29 RX ADMIN — ACETAMINOPHEN 650 MG: 325 TABLET ORAL at 20:57

## 2022-06-29 NOTE — PROGRESS NOTES
CM requested case management office to arrange a PCP.  will continue to monitor and assist with transition of care needs.     LUIS Dodd, RN  Care Management  Pager # [phone]

## 2022-06-29 NOTE — PROGRESS NOTES
Kevin Solorio M.D. FACS  PROGRESS NOTE    Name: Rigoberto Bro MRN: [id number]   : 1980 Hospital: [facility]   Date: 2022 Admission Date: 2022  8:01 PM     Hospital Day: 6  4 Days Post-Op  Subjective:  Patient states that he does not feel well. He does not have any pain. He does not nauseous. He just does not feel well. CT scan reveals a fluid collection in the pelvis but no abscess. Objective:  Vitals:    22 1644 22 1930 22 2312 22 0321   BP: 126/67 133/75 132/76 136/69   Pulse: 79 89 86 79   Resp: 18 18 18 18   Temp: 98.4 °F (36.9 °C) 99.5 °F (37.5 °C) 98.4 °F (36.9 °C) 98.3 °F (36.8 °C)   SpO2: 97% 96% 96% 97%   Weight:         Date 22 0700 - 22 0659 22 0700 - 22 0659   Shift 2838-7509 8608-3911 24 Hour Total 8256-3252 4271-6218 24 Hour Total   INTAKE   P.O. 946  946        P. O. 946  946      Shift Total(mL/kg) 946(11.6)  946(11.6)      OUTPUT   Urine(mL/kg/hr) 600(0.6)  600(0.3)        Urine Voided 600  600      Drains 15  15        Output (ml) (Drain 19 fr. j p drain 22 Anterior Abdomen) 15  15      Shift Total(mL/kg) 615(7.6)  615(7.6)        331      Weight (kg) 81.3 81.3 81.3 81.3 81.3 81.3         Physical Exam:    General: Awake and alert, oriented x4, no apparent distress   Abdomen: abdomen is soft with incisional and suprapubic tenderness at ROSEANNE site. ROSEANNE drain with minimal serosanguineous drainage incision(s) are  C/D/I.   No masses, organomegaly or guarding   Extremities: No c/c/e BLE  Labs:  Recent Results (from the past 24 hour(s))   METABOLIC PANEL, BASIC    Collection Time: 22  9:45 AM   Result Value Ref Range    Sodium 133 (L) 136 - 145 mmol/L    Potassium 3.2 (L) 3.5 - 5.5 mmol/L    Chloride 100 100 - 111 mmol/L    CO2 30 21 - 32 mmol/L    Anion gap 3 3.0 - 18 mmol/L    Glucose 102 (H) 74 - 99 mg/dL    BUN 13 7.0 - 18 MG/DL    Creatinine 0.88 0.6 - 1.3 MG/DL    BUN/Creatinine ratio 15 12 - 20      GFR est AA >60 >60 ml/min/1.73m2    GFR est non-AA >60 >60 ml/min/1.73m2    Calcium 7.8 (L) 8.5 - 46.5 MG/DL   METABOLIC PANEL, BASIC    Collection Time: 06/29/22  5:05 AM   Result Value Ref Range    Sodium 136 136 - 145 mmol/L    Potassium 3.4 (L) 3.5 - 5.5 mmol/L    Chloride 99 (L) 100 - 111 mmol/L    CO2 30 21 - 32 mmol/L    Anion gap 7 3.0 - 18 mmol/L    Glucose 87 74 - 99 mg/dL    BUN 10 7.0 - 18 MG/DL    Creatinine 0.89 0.6 - 1.3 MG/DL    BUN/Creatinine ratio 11 (L) 12 - 20      GFR est AA >60 >60 ml/min/1.73m2    GFR est non-AA >60 >60 ml/min/1.73m2    Calcium 8.1 (L) 8.5 - 10.1 MG/DL   CBC WITH AUTOMATED DIFF    Collection Time: 06/29/22  5:05 AM   Result Value Ref Range    WBC 12.7 4.6 - 13.2 K/uL    RBC 4.30 (L) 4.35 - 5.65 M/uL    HGB 11.6 (L) 13.0 - 16.0 g/dL    HCT 35.3 (L) 36.0 - 48.0 %    MCV 82.1 78.0 - 100.0 FL    MCH 27.0 24.0 - 34.0 PG    MCHC 32.9 31.0 - 37.0 g/dL    RDW 13.7 11.6 - 14.5 %    PLATELET 229 911 - 681 K/uL    MPV 9.1 (L) 9.2 - 11.8 FL    NRBC 0.0 0  WBC    ABSOLUTE NRBC 0.00 0.00 - 0.01 K/uL    NEUTROPHILS 79 (H) 40 - 73 %    LYMPHOCYTES 7 (L) 21 - 52 %    MONOCYTES 6 3 - 10 %    EOSINOPHILS 2 0 - 5 %    BASOPHILS 1 0 - 2 %    IMMATURE GRANULOCYTES 6 (H) 0.0 - 0.5 %    ABS. NEUTROPHILS 10.0 (H) 1.8 - 8.0 K/UL    ABS. LYMPHOCYTES 0.9 0.9 - 3.6 K/UL    ABS. MONOCYTES 0.8 0.05 - 1.2 K/UL    ABS. EOSINOPHILS 0.2 0.0 - 0.4 K/UL    ABS. BASOPHILS 0.1 0.0 - 0.1 K/UL    ABS. IMM.  GRANS. 0.8 (H) 0.00 - 0.04 K/UL    DF AUTOMATED       All Micro Results     Procedure Component Value Units Date/Time    CULTURE, BLOOD [[id number]] Collected: 06/24/22 2245    Order Status: Completed Specimen: Blood Updated: 06/29/22 0641     Special Requests: NO SPECIAL REQUESTS        Culture result: NO GROWTH 5 DAYS       CULTURE, BLOOD [[id number]] Collected: 06/24/22 2230    Order Status: Completed Specimen: Blood Updated: 06/29/22 0641     Special Requests: NO SPECIAL REQUESTS        Culture result: NO GROWTH 5 DAYS       CULTURE, BODY FLUID Carol Jose Rafael STAIN [[id number]]  (Abnormal)  (Susceptibility) Collected: 06/25/22 0834    Order Status: Completed Specimen: Fluid Updated: 06/28/22 1026     Special Requests: NO SPECIAL REQUESTS        GRAM STAIN RARE WBCS SEEN         NO ORGANISMS SEEN        Culture result: HEAVY KLEBSIELLA OXYTOCA         FEW ALPHA STREPTOCOCCUS       CULTURE, ANAEROBIC [[id number]] Collected: 06/25/22 0824    Order Status: Completed Specimen: Surgical Specimen Updated: 06/27/22 1223     Special Requests: NO SPECIAL REQUESTS        Culture result: NO ANAEROBES ISOLATED       CULTURE, TISSUE W Syd Zacarias [[id number]] Collected: 06/25/22 0830    Order Status: Canceled Specimen: Appendix     COVID-19 RAPID TEST [[id number]] Collected: 06/24/22 2250    Order Status: Completed Specimen: Nasopharyngeal Updated: 06/24/22 2327     Specimen source Nasopharyngeal        COVID-19 rapid test Not detected        Comment: Rapid Abbott ID Now       Rapid NAAT:  The specimen is NEGATIVE for SARS-CoV-2, the novel coronavirus associated with COVID-19. Negative results should be treated as presumptive and, if inconsistent with clinical signs and symptoms or necessary for patient management, should be tested with an alternative molecular assay. Negative results do not preclude SARS-CoV-2 infection and should not be used as the sole basis for patient management decisions. This test has been authorized by the FDA under an Emergency Use Authorization (EUA) for use by authorized laboratories.    Fact sheet for Healthcare Providers: ConventionUpdate.co.nz  Fact sheet for Patients: ConventionUpdate.co.nz       Methodology: Isothermal Nucleic Acid Amplification               Current Medications:  Current Facility-Administered Medications   Medication Dose Route Frequency Provider Last Rate Last Admin    dextrose 5% - 0.9% NaCl with KCl 20 mEq/L infusion  75 mL/hr IntraVENous CONTINUOUS Andrews Paul MD        ketorolac (TORADOL) injection 15 mg  15 mg IntraVENous Q8H Andrews Paul MD   15 mg at 06/29/22 0549    levoFLOXacin (LEVAQUIN) 500 mg in D5W IVPB  500 mg IntraVENous Q24H Asia CASAS  mL/hr at 06/29/22 0049 500 mg at 06/29/22 0049    piperacillin-tazobactam (ZOSYN) 4.5 g in 0.9% sodium chloride (MBP/ADV) 100 mL MBP  4.5 g IntraVENous Q8H Asia CASAS MD 25 mL/hr at 06/29/22 0421 4.5 g at 06/29/22 0421    dextrose 5% and 0.9% NaCl 1,000 mL with potassium chloride 20 mEq infusion   IntraVENous CONTINUOUS Andrews Paul MD 75 mL/hr at 06/28/22 2357 New Bag at 06/28/22 2357    sodium chloride (NS) flush 5-40 mL  5-40 mL IntraVENous Q8H Andrews Paul MD   10 mL at 06/29/22 0521    sodium chloride (NS) flush 5-40 mL  5-40 mL IntraVENous PRN Andrews Paul MD        acetaminophen (TYLENOL) tablet 650 mg  650 mg Oral Q6H Andrews Paul MD   650 mg at 06/29/22 0421    oxyCODONE IR (ROXICODONE) tablet 10 mg  10 mg Oral Q6H PRN Andrews Paul MD   10 mg at 06/28/22 0615    naloxone (NARCAN) injection 0.4 mg  0.4 mg IntraVENous PRN Andrews Paul MD        ondansetron [facility]) injection 4 mg  4 mg IntraVENous Q4H PRN Andrews Paul MD   4 mg at 06/27/22 2058    diphenhydrAMINE (BENADRYL) injection 12.5 mg  12.5 mg IntraVENous Q4H PRN Andrews Paul MD        enoxaparin (LOVENOX) injection 40 mg  40 mg SubCUTAneous Q24H Andrews Paul MD   40 mg at 06/28/22 1500       Chart and notes reviewed. Data reviewed. I have evaluated and examined the patient. IMPRESSION:   · Patient is postop day 4 from laparoscopic appendectomy for acute perforated appendicitis with abscess. PLAN:/DISCUSION:   · Continue Zosyn and vancomycin  · Out of bed to chair, encourage incentive spirometry usage, walk around hallway.   · Pain control with oxycodone, Toradol and Tylenol  · ROSEANNE care  · SCDs and Lovenox for DVT prophylaxis        Mariusz Wong MD

## 2022-06-29 NOTE — PROGRESS NOTES
Problem: Pain  Goal: *Control of Pain  Outcome: Progressing Towards Goal     Problem: Patient Education: Go to Patient Education Activity  Goal: Patient/Family Education  Outcome: Progressing Towards Goal     Problem: Falls - Risk of  Goal: *Absence of Falls  Description: Document Maldonadoeliza Weinberg Fall Risk and appropriate interventions in the flowsheet.   Outcome: Progressing Towards Goal  Note: Fall Risk Interventions:  Mobility Interventions: Patient to call before getting OOB         Medication Interventions: Patient to call before getting OOB,Teach patient to arise slowly    Elimination Interventions: Call light in reach              Problem: Patient Education: Go to Patient Education Activity  Goal: Patient/Family Education  Outcome: Progressing Towards Goal     Problem: Surgical Pathway Day of Surgery  Goal: Consults, if ordered  Outcome: Progressing Towards Goal  Goal: Nutrition/Diet  Outcome: Progressing Towards Goal  Goal: Medications  Outcome: Progressing Towards Goal  Goal: Respiratory  Outcome: Progressing Towards Goal  Goal: Treatments/Interventions/Procedures  Outcome: Progressing Towards Goal  Goal: Psychosocial  Outcome: Progressing Towards Goal  Goal: *No signs and symptoms of infection or wound complications  Outcome: Progressing Towards Goal

## 2022-06-29 NOTE — ADT AUTH CERT NOTES
Appendectomy, without Abscess or Peritonitis, by Laparoscopy - Care Day 5 (6/28/2022) by Tierra Gut       Review Status Review Entered   Completed 6/28/2022 14:49      Criteria Review      Care Day: 5 Care Date: 6/28/2022 Level of Care: Inpatient Floor    Guideline Day 1    Level Of Care    ( ) Early AM OR to recovery to discharge    6/28/2022 13:02:55 EDT by Babak Guthrie      Surgical bed    Clinical Status    (X) * Hemodynamic stability    6/28/2022 14:49:53 EDT by Babak Guthrie      HR 81  /62  MAP 77    (X) * Successful uncomplicated laparoscopic appendectomy    6/28/2022 14:49:53 EDT by Babak Guthrei      Procedure successfully done.     (X) * No evidence of postoperative or surgical site infection    6/28/2022 14:49:53 EDT by Alejo Bardales Not indicated    (X) * Diet tolerated    (X) * Pain absent or managed    6/28/2022 14:49:53 EDT by Babak Guthrie      pain is managed by acetaminophen (TYLENOL) tablet 650 mg po q6, oxyCODONE IR (ROXICODONE) tablet 10 mg po q6 prn x1,    (X) * No evidence of ileus or bowel obstruction    6/28/2022 14:49:53 EDT by Alejo Bardales Not indicated    ( ) * Discharge plans and education understood    Activity    (X) * Ambulatory or acceptable for next level of care    6/28/2022 14:49:53 EDT by Babak Guthrie      Ambulate w/ assistance    Routes    (X) * Oral hydration    (X) * Oral medications or regimen acceptable for next level of care    6/28/2022 14:49:53 EDT by Babak Guthrie      PO analgesics provided    (X) * Oral diet or acceptable for next level of care    6/28/2022 14:49:53 EDT by Alejo Bardales Adult diet; clear liquid    Interventions    (X) Possible postoperative CBC    6/28/2022 14:49:53 EDT by José Miguel Barrios see notes for the labs    Medications    ( ) * PCA and parenteral analgesics absent    6/28/2022 14:49:53 EDT by Babak Guthrie      HYDROmorphone (DILAUDID) injection 1 mg iv once (completed), ketorolac (TORADOL) injection 15 mg iv q8 * Milestone   Additional Notes    06/28      Pertinent Updates:   Patient with abdominal pain overnight which responded to Toradol and Dilaudid.  Low-grade fever and persistent white count-20,000      Vitals:  96.9 °F (36.1 °C) 81 106/62 16 96% RA      Abnl/Pertinent Labs/Radiology/Diagnostic Studies:   Sodium: 133 (L)   Potassium: 3.2 (L)   Glucose: 102 (H)   Calcium: 7.8 (L)      Physical Exam:   General: Awake and alert, no apparent distress   Abdomen: abdomen is soft with minimal incisional and ROSEANNE site tenderness.  Incision(s) are C/D/I.  ROSEANNE drain with serosanguineous drainage. No masses, organomegaly or guarding   Extremities: No c/c/e BLE      MD Consults/Assessments & Plans:   Gen. surgery notes:   IMPRESSION:   ·Patient is postop day 3 from laparoscopic appendectomy secondary to acute perforated appendicitis with abscess       PLAN:/DISCUSION:   ·Add Toradol to pain control   ·Add vancomycin to Zosyn   ·CT abdomen pelvis to assess for any undrained abscess collections or evidence of leak from the appendiceal stump   ·Out of bed to chair, encourage incentive spirometry usage   ·Continue DVT prophylaxis with SCDs and subcu Lovenox      Medications:   vancomycin (VANCOCIN) 1,000 mg in 0.9% sodium chloride 250 mL (VIAL-MATE) iv q12   dextrose 5% and 0.9% NaCl 1,000 mL with potassium chloride 20 mEq infusion 75ml/hr iv cont.   enoxaparin (LOVENOX) injection 40 mg sc q24   ondansetron (ZOFRAN) injection 4 mg iv q4 prn x3   piperacillin-tazobactam (ZOSYN) 3.375 g in 0.9% sodium chloride 100 mL iv q8      Orders:   Cont.  IVF infusion   Cont. labs/ blood works    Continuous scd's    Monitor I and O    oximetry routinely/as needed    Warming blanket   Monitor vital signs    Satya Friedman to bulb suction        Appendectomy, without Abscess or Peritonitis, by Laparoscopy - Care Day 4 (6/27/2022) by Bonita Wolff       Review Status Review Entered   Completed 6/28/2022 13:01      Criteria Review      Care Day: 4 Care Date: 6/27/2022 Level of Care: Inpatient Floor    Guideline Day 1    Level Of Care    ( ) Early AM OR to recovery to discharge    6/28/2022 13:01:33 EDT by José Miguel Corrales      Surgical bed    Clinical Status    (X) * Hemodynamic stability    6/28/2022 13:01:33 EDT by José Miguel Corrales      HR 80  /79  MAP 95    (X) * Successful uncomplicated laparoscopic appendectomy    6/28/2022 13:01:33 EDT by Kevin Ashley Procedure successfully done. (X) * No evidence of postoperative or surgical site infection    6/28/2022 13:01:33 EDT by Kevin Ashley Not indicated    ( ) * Diet tolerated    (X) * Pain absent or managed    6/28/2022 13:01:33 EDT by José Miguel Corrales      pain is managed by acetaminophen (TYLENOL) tablet 650 mg po q6 and oxyCODONE IR (ROXICODONE) tablet 10 mg po q6 prn x2    ( ) * No evidence of ileus or bowel obstruction    6/28/2022 13:01:33 EDT by Kevin Ashley Not indicated    ( ) * Discharge plans and education understood    Activity    (X) * Ambulatory or acceptable for next level of care    6/28/2022 13:01:33 EDT by José Miguel Corrales      Ambulate w/ assistance    Routes    ( ) * Oral hydration    (X) * Oral medications or regimen acceptable for next level of care    6/28/2022 13:01:33 EDT by José Miguel Corrales      Oral analgesics for prn use    ( ) * Oral diet or acceptable for next level of care    6/28/2022 13:01:33 EDT by José Miguel Corrales      NPO    Interventions    (X) Possible postoperative CBC    6/28/2022 13:01:33 EDT by Bhavin Puente see notes for the labs    Medications    (X) * PCA and parenteral analgesics absent    6/28/2022 13:01:33 EDT by Kevin Ashley none provided    * Milestone   Additional Notes    06/27      Pertinent Updates:   Pt had nausea but no vomiting.  His pain is under better control.       Vitals:   99 °F (37.2 °C) 80 127/79 16 98 %  RA       Abnl/Pertinent Labs/Radiology/Diagnostic Studies:   WBC: 20.8 (H)   NEUTROPHILS: 85 (H)   LYMPHOCYTES: 4 (L) IMMATURE GRANULOCYTES: 5 (H)   ABS. NEUTROPHILS: 17.5 (H)   ABS. IMM. GRANS.: 1.0 (H)   Sodium: 133 (L)   Chloride: 96 (L)   Glucose: 101 (H)   Calcium: 8.3 (L)      Physical Exam:   General: Awake and alert, oriented x4, no apparent distress   Abdomen: abdomen is soft with incisional and ROSEANNE drain site tenderness. Incision(s) are C/D/I.  ROSEANNE drain with serosanguineous drainage.  No masses, organomegaly or guarding   Extremities: No c/c/e BLE      MD Consults/Assessments & Plans:   Gen. surgery notes:   IMPRESSION:   ·Postop day 2 from laparoscopic appendectomy for acute perforated appendicitis with abscess.       PLAN:/DISCUSION:   ·Infectious disease-continue Zosyn 3.375 g IV every 8 hours.  Awaiting culture results   ·Switch to oral pain control only   ·Encourage p.o. intake   ·Encourage incentive spirometry usage, out of bed to chair, ambulate in hallway   ·Continue Lovenox and SCDs for DVT prophylaxis      Medications:   dextrose 5% and 0.9% NaCl 1,000 mL with potassium chloride 20 mEq infusion 75ml/hr iv cont.   enoxaparin (LOVENOX) injection 40 mg sc q24   ondansetron (ZOFRAN) injection 4 mg iv q4 prn x3   piperacillin-tazobactam (ZOSYN) 3.375 g in 0.9% sodium chloride 100 mL iv q8      Orders:   Cont.  IVF infusion   Cont. labs/ blood works    Continuous scd's    Monitor I and O    oximetry routinely/as needed    Warming blanket   Monitor vital signs    Luis Turenr to bulb suction

## 2022-06-29 NOTE — PROGRESS NOTES
Infectious Disease progress Note        Reason: Perforated appendicitis, abdominal abscess, worsening leukocytosis    Current abx Prior abx   Pip/tazo since 6/24  Levofloxacin since 6/28   Vancomycin 6/28     Lines:       Assessment :    51-year-old male with no significant past medical history presented to the emergency department on 6/24/2022 with complaints of worsening right lower quadrant pain. Ct abd/pelvis without contrast 6/21-Colitis from the cecal tip to the distal transverse colon. Mild  inflammation/edema at the level of the cecum. Ct abd/pelvis with contrast 6/24- Acute appendicitis with evidence of perforation at the tip. No  definable/measurable discrete abscess. Clinical presentation consistent with acute perforated appendicitis with abscess status post laparoscopic appendectomy on 6/25/2022    Intra-Op cultures 6/25-Klebsiella (cefazolin SUSAN 16, ampicillin/sulbactam SUSAN 8, resistant to ampicillin), alpha Streptococcus    Worsening leukocytosis likely combination of postoperative inflammation, partially treated infection due to evolving beta-lactam resistance. Susceptibility pattern of Klebsiella concerning for this. Hence levofloxacin added 6/28  No definitive abscess or undrained infection noted on CT scan 6/20/2022    Improved leukocytosis. Afebrile. Recommendations:    1. Continue Zosyn 4.5 g IV every 8 hours. Cont. levofloxacin  2. Follow-up surgery recommendations  3. Advance diet per surgery  4. Monitor CBC, temperature, clinically  5. We will switch to oral antibiotics once able to tolerate p.o. diet and continued clinical improvement    Above plan was discussed in details with patient, dr. Ada Ledesma. Please call me if any further questions or concerns. Will continue to participate in the care of this patient. HPI:  \"I dont feel good\". Denies worsening abdominal pain, nausea, vomiting.   Has not had any bowel movement since surgery         Past Medical History: Diagnosis Date    S/P appy 06/25/2022    acute ruptured       No past surgical history on file. Home Medication List    Details   amoxicillin-clavulanate (Augmentin) 875-125 mg per tablet Take 1 Tablet by mouth two (2) times a day for 10 days. Qty: 20 Tablet, Refills: 0      dicyclomine (BENTYL) 10 mg capsule Take 1 Capsule by mouth two (2) times a day. Qty: 14 Capsule, Refills: 0      ondansetron hcl (Zofran) 4 mg tablet Take 1 Tablet by mouth every eight (8) hours as needed for Nausea. Qty: 14 Tablet, Refills: 0          Current Facility-Administered Medications   Medication Dose Route Frequency    dextrose 5% - 0.9% NaCl with KCl 20 mEq/L infusion  75 mL/hr IntraVENous CONTINUOUS    ketorolac (TORADOL) injection 15 mg  15 mg IntraVENous Q8H    levoFLOXacin (LEVAQUIN) 500 mg in D5W IVPB  500 mg IntraVENous Q24H    piperacillin-tazobactam (ZOSYN) 4.5 g in 0.9% sodium chloride (MBP/ADV) 100 mL MBP  4.5 g IntraVENous Q8H    dextrose 5% and 0.9% NaCl 1,000 mL with potassium chloride 20 mEq infusion   IntraVENous CONTINUOUS    sodium chloride (NS) flush 5-40 mL  5-40 mL IntraVENous Q8H    sodium chloride (NS) flush 5-40 mL  5-40 mL IntraVENous PRN    acetaminophen (TYLENOL) tablet 650 mg  650 mg Oral Q6H    oxyCODONE IR (ROXICODONE) tablet 10 mg  10 mg Oral Q6H PRN    naloxone (NARCAN) injection 0.4 mg  0.4 mg IntraVENous PRN    ondansetron (ZOFRAN) injection 4 mg  4 mg IntraVENous Q4H PRN    diphenhydrAMINE (BENADRYL) injection 12.5 mg  12.5 mg IntraVENous Q4H PRN    enoxaparin (LOVENOX) injection 40 mg  40 mg SubCUTAneous Q24H       Allergies: Patient has no known allergies. No family history on file.   Social History     Socioeconomic History    Marital status: SINGLE     Spouse name: Not on file    Number of children: Not on file    Years of education: Not on file    Highest education level: Not on file   Occupational History    Not on file   Tobacco Use    Smoking status: Not on file  Smokeless tobacco: Not on file   Substance and Sexual Activity    Alcohol use: Not on file    Drug use: Not on file    Sexual activity: Not on file   Other Topics Concern    Not on file   Social History Narrative    Not on file     Social Determinants of Health     Financial Resource Strain:     Difficulty of Paying Living Expenses: Not on file   Food Insecurity:     Worried About Running Out of Food in the Last Year: Not on file    Frank of Food in the Last Year: Not on file   Transportation Needs:     Lack of Transportation (Medical): Not on file    Lack of Transportation (Non-Medical): Not on file   Physical Activity:     Days of Exercise per Week: Not on file    Minutes of Exercise per Session: Not on file   Stress:     Feeling of Stress : Not on file   Social Connections:     Frequency of Communication with Friends and Family: Not on file    Frequency of Social Gatherings with Friends and Family: Not on file    Attends Yazdanism Services: Not on file    Active Member of [address] [place] [address] or Organizations: Not on file    Attends Club or Organization Meetings: Not on file    Marital Status: Not on file   Intimate Partner Violence:     Fear of Current or Ex-Partner: Not on file    Emotionally Abused: Not on file    Physically Abused: Not on file    Sexually Abused: Not on file   Housing Stability:     Unable to Pay for Housing in the Last Year: Not on file    Number of [place] in the Last Year: Not on file    Unstable Housing in the Last Year: Not on file     Social History     Tobacco Use   Smoking Status Not on file   Smokeless Tobacco Not on file        Temp (24hrs), Av.3 °F (36.8 °C), Min:96.9 °F (36.1 °C), Max:99.5 °F (37.5 °C)    Visit Vitals  /76   Pulse 78   Temp 98.2 °F (36.8 °C)   Resp 19   Wt 81.3 kg (179 lb 3.2 oz)   SpO2 100%   BMI 24.30 kg/m²       ROS: 12 point ROS obtained in details.  Pertinent positives as mentioned in HPI,   otherwise negative    Physical Exam:    General: Well developed, well nourished male laying on the bed AAOx3 in no acute distress. General:   awake alert and oriented   HEENT:  Normocephalic, atraumatic, EOMI, no scleral icterus or pallor; no conjunctival hemmohage;  nasal and oral mucous are moist and without evidence of lesions. Neck supple, no bruits. Lymph Nodes:   no cervical, axillary or inguinal adenopathy   Lungs:   non-labored, bilateral chest movements equal   Heart:  RRR, s1 and s2; no rubs or gallops, no edema   Abdomen:  soft, distended, active bowel sounds, no hepatomegaly, no splenomegaly, lower abdominal tenderness-maximal left lower quadrant-no guarding, rigidity   Genitourinary:  deferred   Extremities:   no clubbing, cyanosis; no joint effusions or swelling; Full ROM of all large joints to the upper and lower extremities; muscle mass appropriate for age   Neurologic:  No gross focal sensory abnormalities; 5/5 muscle strength to upper and lower extremities. Speech appropriate. Cranial nerves intact                        Skin:  No rash or ulcers noted   Back:  no spinal or paraspinal muscle tenderness or rigidity, no CVA tenderness     Psychiatric:  No suicidal or homicidal ideations, appropriate mood and affect         Labs: Results:   Chemistry Recent Labs     06/29/22  0505 06/28/22  0945 06/27/22  0832   GLU 87 102* 101*    133* 133*   K 3.4* 3.2* 3.7   CL 99* 100 96*   CO2 30 30 31   BUN 10 13 15   CREA 0.89 0.88 0.94   CA 8.1* 7.8* 8.3*   AGAP 7 3 6   BUCR 11* 15 16      CBC w/Diff Recent Labs     06/29/22  0505 06/27/22  0832 06/27/22  0307   WBC 12.7 20.8* 18.0*   RBC 4.30* 4.81 4.37   HGB 11.6* 13.0 12.0*   HCT 35.3* 39.8 36.0    255 231   GRANS 79* 85* 85*   LYMPH 7* 4* 4*   EOS 2 0 0      Microbiology No results for input(s): CULT in the last 72 hours. RADIOLOGY:    All available imaging studies/reports in [facility] for this admission were reviewed    Total time spent >35 minutes.  High complexity decision making was performed during the evaluation of this patient at high risk for decompensation with      Above mentioned total time spent on reviewing the case/medical record/data/notes/EMR/patient examination/documentation/coordinating care with nurse/consultants, exclusive of procedures with complex decision making performed and > 50% time spent in face to face evaluation. Disclaimer: Sections of this note are dictated utilizing voice recognition software, which may have resulted in some phonetic based errors in grammar and contents. Even though attempts were made to correct all the mistakes, some may have been missed, and remained in the body of the document. If questions arise, please contact our department.     Dr. Khalida Williamson, Infectious Disease Specialist  [phone]  June 29, 2022  4:00 PM

## 2022-06-30 LAB
BACTERIA SPEC CULT: NORMAL
BACTERIA SPEC CULT: NORMAL
SERVICE CMNT-IMP: NORMAL
SERVICE CMNT-IMP: NORMAL

## 2022-06-30 PROCEDURE — 74011250636 HC RX REV CODE- 250/636: Performed by: SURGERY

## 2022-06-30 PROCEDURE — 74011000258 HC RX REV CODE- 258: Performed by: INTERNAL MEDICINE

## 2022-06-30 PROCEDURE — 74011250636 HC RX REV CODE- 250/636: Performed by: INTERNAL MEDICINE

## 2022-06-30 PROCEDURE — 74011250637 HC RX REV CODE- 250/637: Performed by: SURGERY

## 2022-06-30 PROCEDURE — 74011000250 HC RX REV CODE- 250: Performed by: SURGERY

## 2022-06-30 PROCEDURE — 2709999900 HC NON-CHARGEABLE SUPPLY

## 2022-06-30 PROCEDURE — 65270000029 HC RM PRIVATE

## 2022-06-30 RX ADMIN — ENOXAPARIN SODIUM 40 MG: 100 INJECTION SUBCUTANEOUS at 16:36

## 2022-06-30 RX ADMIN — KETOROLAC TROMETHAMINE 15 MG: 15 INJECTION, SOLUTION INTRAMUSCULAR; INTRAVENOUS at 06:34

## 2022-06-30 RX ADMIN — SODIUM CHLORIDE, PRESERVATIVE FREE 10 ML: 5 INJECTION INTRAVENOUS at 05:49

## 2022-06-30 RX ADMIN — ACETAMINOPHEN 650 MG: 325 TABLET ORAL at 20:32

## 2022-06-30 RX ADMIN — ACETAMINOPHEN 650 MG: 325 TABLET ORAL at 09:14

## 2022-06-30 RX ADMIN — ACETAMINOPHEN 650 MG: 325 TABLET ORAL at 02:14

## 2022-06-30 RX ADMIN — LEVOFLOXACIN 500 MG: 500 INJECTION, SOLUTION INTRAVENOUS at 22:35

## 2022-06-30 RX ADMIN — SODIUM CHLORIDE, PRESERVATIVE FREE 10 ML: 5 INJECTION INTRAVENOUS at 21:22

## 2022-06-30 RX ADMIN — DOCUSATE SODIUM 100 MG: 100 CAPSULE, LIQUID FILLED ORAL at 20:32

## 2022-06-30 RX ADMIN — POTASSIUM CHLORIDE, DEXTROSE MONOHYDRATE AND SODIUM CHLORIDE 75 ML/HR: 150; 5; 900 INJECTION, SOLUTION INTRAVENOUS at 20:59

## 2022-06-30 RX ADMIN — PIPERACILLIN AND TAZOBACTAM 4.5 G: 4; .5 INJECTION, POWDER, FOR SOLUTION INTRAVENOUS at 19:16

## 2022-06-30 RX ADMIN — HYDROMORPHONE HYDROCHLORIDE 4 MG: 4 TABLET ORAL at 10:04

## 2022-06-30 RX ADMIN — DOCUSATE SODIUM 100 MG: 100 CAPSULE, LIQUID FILLED ORAL at 09:14

## 2022-06-30 RX ADMIN — PIPERACILLIN AND TAZOBACTAM 4.5 G: 4; .5 INJECTION, POWDER, FOR SOLUTION INTRAVENOUS at 02:15

## 2022-06-30 RX ADMIN — KETOROLAC TROMETHAMINE 15 MG: 15 INJECTION, SOLUTION INTRAMUSCULAR; INTRAVENOUS at 21:22

## 2022-06-30 RX ADMIN — PIPERACILLIN AND TAZOBACTAM 4.5 G: 4; .5 INJECTION, POWDER, FOR SOLUTION INTRAVENOUS at 12:33

## 2022-06-30 NOTE — PROGRESS NOTES
Problem: Pain  Goal: *Control of Pain  Outcome: Progressing Towards Goal     Problem: Patient Education: Go to Patient Education Activity  Goal: Patient/Family Education  Outcome: Progressing Towards Goal     Problem: Falls - Risk of  Goal: *Absence of Falls  Description: Document Jason Tam Fall Risk and appropriate interventions in the flowsheet.   Outcome: Progressing Towards Goal  Note: Fall Risk Interventions:  Mobility Interventions: Patient to call before getting OOB,Assess mobility with egress test         Medication Interventions: Patient to call before getting OOB,Assess postural VS orthostatic hypotension    Elimination Interventions: Bed/chair exit alarm,Call light in reach

## 2022-06-30 NOTE — PROGRESS NOTES
Kevin Ortiz M.D. FACS  PROGRESS NOTE    Name: Omero Valenzuela MRN: [id number]   : 1980 Hospital: [facility]   Date: 2022 Admission Date: 2022  8:01 PM     Hospital Day: 7  5 Days Post-Op  Subjective:  Patient states that he feels \"so-so\". We talked about the fact that he had sepsis on admission secondary to the perforated appendicitis with abscess. He now has a resolving ileus. His nutrition has been minimal over the past 7 days. It would be a while before he will feel like himself. He had bowel movements this morning. He is passing gas with the bowel movements. His appetite has not returned yet. His pain control is improving. Objective:  Vitals:    22 1424 22 1947 22 2301 22 0357   BP: 126/74 (!) 144/82 123/71 138/77   Pulse: 81 76 63 61   Resp: 17 18 18 17   Temp: 98.6 °F (37 °C) 99.5 °F (37.5 °C) 98.6 °F (37 °C) 99 °F (37.2 °C)   SpO2: 98% 98% 96% 96%   Weight:         Date 22 07 - 22 0659 22 0700 - 22 0659   Shift 2224-3834 9102-2660 24 Hour Total 0819-2704 6097-5865 24 Hour Total   INTAKE   P.O. 960 1440 2400        P. O. 960 1440 2400      I. V.(mL/kg/hr)  1631.3(1.7) 1631.3(0.8)        Volume (dextrose 5% - 0.9% NaCl with KCl 20 mEq/L infusion)  1031. 3 1031. 3        Volume (levoFLOXacin (LEVAQUIN) 500 mg in D5W IVPB)  200 200        Volume (piperacillin-tazobactam (ZOSYN) 4.5 g in 0.9% sodium chloride (MBP/ADV) 100 mL MBP)  400 400      Shift Total(mL/kg) 960(11.8) 3071. 3(37.8) 4031.3(49.6)      OUTPUT   Urine(mL/kg/hr) 1300(1.3) 1050(1.1) 2350(1.2)        Urine Voided 1300 1050 2350      Drains 25 0 25        Output (ml) (Drain 19 fr. j p drain 22 Anterior Abdomen) 25 0 25      Shift Total(mL/kg) 1325(16.3) 7615(13.6) 2041(14.1)      NET -365 2021. 3 1656.3      Weight (kg) 81.3 81.3 81.3 81.3 81.3 81.3         Physical Exam:    General: Awake and alert, oriented x4, no apparent distress   Abdomen: abdomen is soft with incisional tenderness. Incision(s) are C/D/I.  ROSEANNE drain with serosanguineous drainage. ROSEANNE site is clean and clear. No masses, organomegaly or guarding   Extremities: No c/c/e BLE  Labs:  No results found for this or any previous visit (from the past 24 hour(s)). All Micro Results     Procedure Component Value Units Date/Time    CULTURE, BLOOD [[id number]] Collected: 06/24/22 2245    Order Status: Completed Specimen: Blood Updated: 06/30/22 0636     Special Requests: NO SPECIAL REQUESTS        Culture result: NO GROWTH 6 DAYS       CULTURE, BLOOD [[id number]] Collected: 06/24/22 2230    Order Status: Completed Specimen: Blood Updated: 06/30/22 0636     Special Requests: NO SPECIAL REQUESTS        Culture result: NO GROWTH 6 DAYS       CULTURE, BODY FLUID Ninfa Perryton STAIN [[id number]]  (Abnormal)  (Susceptibility) Collected: 06/25/22 0834    Order Status: Completed Specimen: Fluid Updated: 06/28/22 1026     Special Requests: NO SPECIAL REQUESTS        GRAM STAIN RARE WBCS SEEN         NO ORGANISMS SEEN        Culture result: HEAVY KLEBSIELLA OXYTOCA         FEW ALPHA STREPTOCOCCUS       CULTURE, ANAEROBIC [[id number]] Collected: 06/25/22 0824    Order Status: Completed Specimen: Surgical Specimen Updated: 06/27/22 1223     Special Requests: NO SPECIAL REQUESTS        Culture result: NO ANAEROBES ISOLATED       CULTURE, TISSUE W Rigo Velez [[id number]] Collected: 06/25/22 0830    Order Status: Canceled Specimen: Appendix     COVID-19 RAPID TEST [[id number]] Collected: 06/24/22 2250    Order Status: Completed Specimen: Nasopharyngeal Updated: 06/24/22 2327     Specimen source Nasopharyngeal        COVID-19 rapid test Not detected        Comment: Rapid Abbott ID Now       Rapid NAAT:  The specimen is NEGATIVE for SARS-CoV-2, the novel coronavirus associated with COVID-19.        Negative results should be treated as presumptive and, if inconsistent with clinical signs and symptoms or necessary for patient management, should be tested with an alternative molecular assay. Negative results do not preclude SARS-CoV-2 infection and should not be used as the sole basis for patient management decisions. This test has been authorized by the FDA under an Emergency Use Authorization (EUA) for use by authorized laboratories.    Fact sheet for Healthcare Providers: ConventionUpdate.coMacarionz  Fact sheet for Patients: ConventionUpdate.co.nz       Methodology: Isothermal Nucleic Acid Amplification               Current Medications:  Current Facility-Administered Medications   Medication Dose Route Frequency Provider Last Rate Last Admin    dextrose 5% - 0.9% NaCl with KCl 20 mEq/L infusion  75 mL/hr IntraVENous CONTINUOUS Daniela Puentes MD 75 mL/hr at 06/29/22 1016 75 mL/hr at 06/29/22 1016    docusate sodium (COLACE) capsule 100 mg  100 mg Oral BID aDniela Puentes MD   100 mg at 06/29/22 1703    bisacodyL (DULCOLAX) suppository 10 mg  10 mg Rectal DAILY PRN Daniela Puentes MD        HYDROmorphone (DILAUDID) tablet 4 mg  4 mg Oral Q6H PRN Daniela Puentes MD   4 mg at 06/29/22 1852    ketorolac (TORADOL) injection 15 mg  15 mg IntraVENous Q8H Daniela Puentes MD   15 mg at 06/30/22 0634    levoFLOXacin (LEVAQUIN) 500 mg in D5W IVPB  500 mg IntraVENous Q24H Yamileth CASAS  mL/hr at 06/29/22 2244 500 mg at 06/29/22 2244    piperacillin-tazobactam (ZOSYN) 4.5 g in 0.9% sodium chloride (MBP/ADV) 100 mL MBP  4.5 g IntraVENous Q8H Yamileth CASAS MD 25 mL/hr at 06/30/22 0215 4.5 g at 06/30/22 0215    sodium chloride (NS) flush 5-40 mL  5-40 mL IntraVENous Q8H Daniela Puentes MD   10 mL at 06/30/22 0549    sodium chloride (NS) flush 5-40 mL  5-40 mL IntraVENous PRN Daniela Puentes MD        acetaminophen (TYLENOL) tablet 650 mg  650 mg Oral Q6H Daniela Puentes MD   650 mg at 06/30/22 0214    naloxone [facility]) injection 0.4 mg  0.4 mg IntraVENous PRN Erin Lopes MD        ondansetron [facility]) injection 4 mg  4 mg IntraVENous Q4H PRN Erin Lopes MD   4 mg at 06/27/22 2058    diphenhydrAMINE (BENADRYL) injection 12.5 mg  12.5 mg IntraVENous Q4H PRN Erin Lopes MD        enoxaparin (LOVENOX) injection 40 mg  40 mg SubCUTAneous Q24H Erin Lopes MD   40 mg at 06/29/22 1701       Chart and notes reviewed. Data reviewed. I have evaluated and examined the patient. IMPRESSION:   · Patient is postop day 5 from laparoscopic appendectomy for perforated acute appendicitis with abscess now with with resolving sepsis and ileus. PLAN:/DISCUSION:   · Encourage p.o. intake. Bowel regimen. · Encourage incentive spirometry usage, out of bed to chair, ambulate in hallway  · Continue IV Zosyn and vancomycin  · ROSEANNE drain removed.   The occlusive dressing is to remain in place for 2 days prior to removal.  · DVT prophylaxis-SCDs to bilateral lower extremities when in bed and Lovenox 40 mg subcu 24 hours        Ollie Castleman, MD

## 2022-06-30 NOTE — ROUTINE PROCESS
Patient is alert and oriented times three with no signs or symptoms of distress. Drain is intact has nothing in it.  Lap sites are intact No nausea or vomiting

## 2022-06-30 NOTE — PROGRESS NOTES
KATIE notified the patient a PCP has been arranged. Appointment 7/13/2022 with Cristhian Vasques NP. Information will be provided on discharge.  will continue to monitor and assist with transition of care needs.     Cammy Schlatter, BSN, RN  Care Management  Pager # [phone]

## 2022-06-30 NOTE — PROGRESS NOTES
Infectious Disease progress Note        Reason: Perforated appendicitis, abdominal abscess, worsening leukocytosis    Current abx Prior abx   Pip/tazo since 6/24  Levofloxacin since 6/28   Vancomycin 6/28     Lines:       Assessment :    72-year-old male with no significant past medical history presented to the emergency department on 6/24/2022 with complaints of worsening right lower quadrant pain. Ct abd/pelvis without contrast 6/21-Colitis from the cecal tip to the distal transverse colon. Mild  inflammation/edema at the level of the cecum. Ct abd/pelvis with contrast 6/24- Acute appendicitis with evidence of perforation at the tip. No  definable/measurable discrete abscess. Clinical presentation consistent with acute perforated appendicitis with abscess status post laparoscopic appendectomy on 6/25/2022    Intra-Op cultures 6/25-Klebsiella (cefazolin SUSAN 16, ampicillin/sulbactam SUSAN 8, resistant to ampicillin), alpha Streptococcus    Worsening leukocytosis likely combination of postoperative inflammation, partially treated infection due to evolving beta-lactam resistance. Susceptibility pattern of Klebsiella concerning for this. Hence levofloxacin added 6/28  No definitive abscess or undrained infection noted on CT scan 6/20/2022    Improved leukocytosis. Afebrile. Recommendations:    1. Continue Zosyn, levofloxacin  2. Follow-up surgery recommendations  3. Advance diet per surgery  4. Monitor CBC, temperature, clinically  5. We will switch to oral antibiotics once able to tolerate p.o. diet and continued clinical improvement    Above plan was discussed in details with patient, dr. Serenity Bingham. Please call me if any further questions or concerns. Will continue to participate in the care of this patient. HPI:  Poor appetite. Had BM overnight. Denies worsening abdominal pain, nausea, vomiting.           Past Medical History:   Diagnosis Date    S/P appy 06/25/2022    acute ruptured       No past surgical history on file. Home Medication List    Details   amoxicillin-clavulanate (Augmentin) 875-125 mg per tablet Take 1 Tablet by mouth two (2) times a day for 10 days. Qty: 20 Tablet, Refills: 0      dicyclomine (BENTYL) 10 mg capsule Take 1 Capsule by mouth two (2) times a day. Qty: 14 Capsule, Refills: 0      ondansetron hcl (Zofran) 4 mg tablet Take 1 Tablet by mouth every eight (8) hours as needed for Nausea. Qty: 14 Tablet, Refills: 0          Current Facility-Administered Medications   Medication Dose Route Frequency    dextrose 5% - 0.9% NaCl with KCl 20 mEq/L infusion  75 mL/hr IntraVENous CONTINUOUS    docusate sodium (COLACE) capsule 100 mg  100 mg Oral BID    bisacodyL (DULCOLAX) suppository 10 mg  10 mg Rectal DAILY PRN    HYDROmorphone (DILAUDID) tablet 4 mg  4 mg Oral Q6H PRN    ketorolac (TORADOL) injection 15 mg  15 mg IntraVENous Q8H    levoFLOXacin (LEVAQUIN) 500 mg in D5W IVPB  500 mg IntraVENous Q24H    piperacillin-tazobactam (ZOSYN) 4.5 g in 0.9% sodium chloride (MBP/ADV) 100 mL MBP  4.5 g IntraVENous Q8H    sodium chloride (NS) flush 5-40 mL  5-40 mL IntraVENous Q8H    sodium chloride (NS) flush 5-40 mL  5-40 mL IntraVENous PRN    acetaminophen (TYLENOL) tablet 650 mg  650 mg Oral Q6H    naloxone (NARCAN) injection 0.4 mg  0.4 mg IntraVENous PRN    ondansetron (ZOFRAN) injection 4 mg  4 mg IntraVENous Q4H PRN    diphenhydrAMINE (BENADRYL) injection 12.5 mg  12.5 mg IntraVENous Q4H PRN    enoxaparin (LOVENOX) injection 40 mg  40 mg SubCUTAneous Q24H       Allergies: Patient has no known allergies. No family history on file.   Social History     Socioeconomic History    Marital status: SINGLE     Spouse name: Not on file    Number of children: Not on file    Years of education: Not on file    Highest education level: Not on file   Occupational History    Not on file   Tobacco Use    Smoking status: Not on file    Smokeless tobacco: Not on file   Substance and Sexual Activity    Alcohol use: Not on file    Drug use: Not on file    Sexual activity: Not on file   Other Topics Concern    Not on file   Social History Narrative    Not on file     Social Determinants of Health     Financial Resource Strain:     Difficulty of Paying Living Expenses: Not on file   Food Insecurity:     Worried About Running Out of Food in the Last Year: Not on file    Frank of Food in the Last Year: Not on file   Transportation Needs:     Lack of Transportation (Medical): Not on file    Lack of Transportation (Non-Medical): Not on file   Physical Activity:     Days of Exercise per Week: Not on file    Minutes of Exercise per Session: Not on file   Stress:     Feeling of Stress : Not on file   Social Connections:     Frequency of Communication with Friends and Family: Not on file    Frequency of Social Gatherings with Friends and Family: Not on file    Attends [employer] Services: Not on file    Active Member of [address] [place] [address] or Organizations: Not on file    Attends Club or Organization Meetings: Not on file    Marital Status: Not on file   Intimate Partner Violence:     Fear of Current or Ex-Partner: Not on file    Emotionally Abused: Not on file    Physically Abused: Not on file    Sexually Abused: Not on file   Housing Stability:     Unable to Pay for Housing in the Last Year: Not on file    Number of [place] in the Last Year: Not on file    Unstable Housing in the Last Year: Not on file     Social History     Tobacco Use   Smoking Status Not on file   Smokeless Tobacco Not on file        Temp (24hrs), Av.8 °F (37.1 °C), Min:98.3 °F (36.8 °C), Max:99.5 °F (37.5 °C)    Visit Vitals  /77   Pulse (!) 55   Temp 98.7 °F (37.1 °C)   Resp 18   Wt 81.3 kg (179 lb 3.2 oz)   SpO2 99%   BMI 24.30 kg/m²       ROS: 12 point ROS obtained in details.  Pertinent positives as mentioned in HPI,   otherwise negative    Physical Exam:    General: Well developed, well nourished male laying on the bed AAOx3 in no acute distress. General:   awake alert and oriented   HEENT:  Normocephalic, atraumatic, EOMI, no scleral icterus or pallor; no conjunctival hemmohage;  nasal and oral mucous are moist and without evidence of lesions. Neck supple, no bruits. Lymph Nodes:   not examined   Lungs:   non-labored, bilateral chest movements equal   Heart:  RRR, s1 and s2; no rubs or gallops, no edema   Abdomen:  soft, distended, active bowel sounds, no hepatomegaly, no splenomegaly, improved lower abdominal tenderness-no guarding, rigidity   Genitourinary:  deferred   Extremities:   no clubbing, cyanosis; no joint effusions or swelling; Full ROM of all large joints to the upper and lower extremities; muscle mass appropriate for age   Neurologic:  No gross focal sensory or motor abnormalities; Speech appropriate. Cranial nerves intact                        Skin:  Surgical changes on abdomen   Back:  not examined     Psychiatric:  Blunted affect         Labs: Results:   Chemistry Recent Labs     06/29/22  0505 06/28/22  0945   GLU 87 102*    133*   K 3.4* 3.2*   CL 99* 100   CO2 30 30   BUN 10 13   CREA 0.89 0.88   CA 8.1* 7.8*   AGAP 7 3   BUCR 11* 15      CBC w/Diff Recent Labs     06/29/22  0505   WBC 12.7   RBC 4.30*   HGB 11.6*   HCT 35.3*      GRANS 79*   LYMPH 7*   EOS 2      Microbiology No results for input(s): CULT in the last 72 hours. RADIOLOGY:    All available imaging studies/reports in [facility] for this admission were reviewed      Disclaimer: Sections of this note are dictated utilizing voice recognition software, which may have resulted in some phonetic based errors in grammar and contents. Even though attempts were made to correct all the mistakes, some may have been missed, and remained in the body of the document. If questions arise, please contact our department.     Dr. John Zuñiga, Infectious Disease Specialist  [phone]  June 30, 2022  4:00 PM

## 2022-06-30 NOTE — PROGRESS NOTES
Physician Progress Note      Trinity Miles  CSN #:                  [id number]  :                       1980  ADMIT DATE:       2022 8:01 PM  [address] [place] DATE:  RESPONDING  PROVIDER #:        Erica Moulton MD          QUERY TEXT:    Pt admitted with appendicitis with abscess. Pt noted to have elevated WBC and Lactic Acid. If possible, please document in the progress notes and discharge summary if you are evaluating and /or treating any of the following: The medical record reflects the following:  Risk Factors: 46 y.o. male with no significant past medical history comes into the ED today due to abdominal pain. Clinical Indicators:  OP NOTE: Post-operative Diagnosis: Acute perforated appendicitis with abscess  Upon admit  WBC - 20.8,  - 20.7,  - 14.2  Lactic  - 2.68  Treatment: Zosyn, Levofloxacin, Lap Appendectomy, 2L NS bolus on     Thank you,  Kenji Rice RN, JOSHUA [email or web address]  .  Options provided:  -- Sepsis, present on admission  -- Sepsis was ruled out  -- Other - I will add my own diagnosis  -- Disagree - Not applicable / Not valid  -- Disagree - Clinically unable to determine / Unknown  -- Refer to Clinical Documentation Reviewer    PROVIDER RESPONSE TEXT:    This patient has sepsis which was present on admission.     Query created by: Uvaldo Hayes on 2022 1:21 PM      Electronically signed by:  Erica Moulton MD 2022 4:12 PM

## 2022-06-30 NOTE — PROGRESS NOTES
Pt has been uncooperative for this shift  LBM repoted today x3  Was in the restroom when the doctor and I arrive this morning but did not report urine or bowel movent if he had one  ROSEANNE drain removed, tegaderm and gauze in place no drainage noted  No pain reported  Refused all meals   He did have one soda  Mother has been at the bedside to assist, raji was called to assist and the pt turned them all away  When as pt questions he just looks at you and he has been refusing medications this shift  I have encouraged and educated and the pt but no success with cooperation today

## 2022-06-30 NOTE — PROGRESS NOTES
Bedside and Verbal shift change report given to Bishnu Marquez RN (oncoming nurse) by Asad Gupta RN (offgoing nurse).  Report included the following information SBAR, Kardex, Intake/Output and MAR

## 2022-06-30 NOTE — ROUTINE PROCESS
Patient is in the bathroom washing up he is alert and oriented times three with a flat affect He is not eating much might not like the food but drinks a lot.  No nausea or vomiting says he feels full quicker now he had three loose BMS today according to mother

## 2022-06-30 NOTE — ROUTINE PROCESS
Bedside shift change report given to Cuate Stanley rn (oncoming nurse) by Madison Tiwari (offgoing nurse). Report included the following information SBAR and Kardex.

## 2022-07-01 LAB
ALBUMIN SERPL-MCNC: 2.2 G/DL (ref 3.4–5)
ALBUMIN/GLOB SERPL: 0.5 {RATIO} (ref 0.8–1.7)
ALP SERPL-CCNC: 140 U/L (ref 45–117)
ALT SERPL-CCNC: 64 U/L (ref 16–61)
ANION GAP SERPL CALC-SCNC: 6 MMOL/L (ref 3–18)
AST SERPL-CCNC: 73 U/L (ref 10–38)
BASOPHILS # BLD: 0.1 K/UL (ref 0–0.1)
BASOPHILS NFR BLD: 0 % (ref 0–2)
BILIRUB DIRECT SERPL-MCNC: 0.2 MG/DL (ref 0–0.2)
BILIRUB SERPL-MCNC: 0.4 MG/DL (ref 0.2–1)
BUN SERPL-MCNC: 6 MG/DL (ref 7–18)
BUN/CREAT SERPL: 7 (ref 12–20)
CALCIUM SERPL-MCNC: 8.4 MG/DL (ref 8.5–10.1)
CHLORIDE SERPL-SCNC: 99 MMOL/L (ref 100–111)
CO2 SERPL-SCNC: 30 MMOL/L (ref 21–32)
CREAT SERPL-MCNC: 0.81 MG/DL (ref 0.6–1.3)
DIFFERENTIAL METHOD BLD: ABNORMAL
EOSINOPHIL # BLD: 0.2 K/UL (ref 0–0.4)
EOSINOPHIL NFR BLD: 2 % (ref 0–5)
ERYTHROCYTE [DISTWIDTH] IN BLOOD BY AUTOMATED COUNT: 13.7 % (ref 11.6–14.5)
GLOBULIN SER CALC-MCNC: 4.7 G/DL (ref 2–4)
GLUCOSE SERPL-MCNC: 94 MG/DL (ref 74–99)
HCT VFR BLD AUTO: 34.6 % (ref 36–48)
HGB BLD-MCNC: 11.3 G/DL (ref 13–16)
IMM GRANULOCYTES # BLD AUTO: 0.5 K/UL (ref 0–0.04)
IMM GRANULOCYTES NFR BLD AUTO: 4 % (ref 0–0.5)
LYMPHOCYTES # BLD: 0.7 K/UL (ref 0.9–3.6)
LYMPHOCYTES NFR BLD: 6 % (ref 21–52)
MAGNESIUM SERPL-MCNC: 2.1 MG/DL (ref 1.6–2.6)
MCH RBC QN AUTO: 27.3 PG (ref 24–34)
MCHC RBC AUTO-ENTMCNC: 32.7 G/DL (ref 31–37)
MCV RBC AUTO: 83.6 FL (ref 78–100)
MONOCYTES # BLD: 0.8 K/UL (ref 0.05–1.2)
MONOCYTES NFR BLD: 6 % (ref 3–10)
NEUTS SEG # BLD: 9.9 K/UL (ref 1.8–8)
NEUTS SEG NFR BLD: 81 % (ref 40–73)
NRBC # BLD: 0 K/UL (ref 0–0.01)
NRBC BLD-RTO: 0 PER 100 WBC
PHOSPHATE SERPL-MCNC: 3.1 MG/DL (ref 2.5–4.9)
PLATELET # BLD AUTO: 388 K/UL (ref 135–420)
PMV BLD AUTO: 9 FL (ref 9.2–11.8)
POTASSIUM SERPL-SCNC: 4.1 MMOL/L (ref 3.5–5.5)
PREALB SERPL-MCNC: 9.2 MG/DL (ref 20–40)
PROT SERPL-MCNC: 6.9 G/DL (ref 6.4–8.2)
RBC # BLD AUTO: 4.14 M/UL (ref 4.35–5.65)
SODIUM SERPL-SCNC: 135 MMOL/L (ref 136–145)
WBC # BLD AUTO: 12.2 K/UL (ref 4.6–13.2)

## 2022-07-01 PROCEDURE — 74011000258 HC RX REV CODE- 258: Performed by: INTERNAL MEDICINE

## 2022-07-01 PROCEDURE — 74011000250 HC RX REV CODE- 250: Performed by: SURGERY

## 2022-07-01 PROCEDURE — 84134 ASSAY OF PREALBUMIN: CPT

## 2022-07-01 PROCEDURE — 74011250637 HC RX REV CODE- 250/637: Performed by: SURGERY

## 2022-07-01 PROCEDURE — 74011250636 HC RX REV CODE- 250/636: Performed by: INTERNAL MEDICINE

## 2022-07-01 PROCEDURE — 80048 BASIC METABOLIC PNL TOTAL CA: CPT

## 2022-07-01 PROCEDURE — 84100 ASSAY OF PHOSPHORUS: CPT

## 2022-07-01 PROCEDURE — 85025 COMPLETE CBC W/AUTO DIFF WBC: CPT

## 2022-07-01 PROCEDURE — 36415 COLL VENOUS BLD VENIPUNCTURE: CPT

## 2022-07-01 PROCEDURE — 74011250636 HC RX REV CODE- 250/636: Performed by: SURGERY

## 2022-07-01 PROCEDURE — 80076 HEPATIC FUNCTION PANEL: CPT

## 2022-07-01 PROCEDURE — 65270000029 HC RM PRIVATE

## 2022-07-01 PROCEDURE — 2709999900 HC NON-CHARGEABLE SUPPLY

## 2022-07-01 PROCEDURE — 83735 ASSAY OF MAGNESIUM: CPT

## 2022-07-01 RX ORDER — METRONIDAZOLE 500 MG/100ML
500 INJECTION, SOLUTION INTRAVENOUS EVERY 12 HOURS
Status: DISCONTINUED | OUTPATIENT
Start: 2022-07-01 | End: 2022-07-02 | Stop reason: HOSPADM

## 2022-07-01 RX ADMIN — DOCUSATE SODIUM 100 MG: 100 CAPSULE, LIQUID FILLED ORAL at 17:07

## 2022-07-01 RX ADMIN — METRONIDAZOLE 500 MG: 500 INJECTION, SOLUTION INTRAVENOUS at 19:45

## 2022-07-01 RX ADMIN — KETOROLAC TROMETHAMINE 15 MG: 15 INJECTION, SOLUTION INTRAMUSCULAR; INTRAVENOUS at 22:18

## 2022-07-01 RX ADMIN — PIPERACILLIN AND TAZOBACTAM 4.5 G: 4; .5 INJECTION, POWDER, FOR SOLUTION INTRAVENOUS at 10:16

## 2022-07-01 RX ADMIN — KETOROLAC TROMETHAMINE 15 MG: 15 INJECTION, SOLUTION INTRAMUSCULAR; INTRAVENOUS at 14:05

## 2022-07-01 RX ADMIN — HYDROMORPHONE HYDROCHLORIDE 4 MG: 4 TABLET ORAL at 22:18

## 2022-07-01 RX ADMIN — ENOXAPARIN SODIUM 40 MG: 100 INJECTION SUBCUTANEOUS at 16:37

## 2022-07-01 RX ADMIN — DOCUSATE SODIUM 100 MG: 100 CAPSULE, LIQUID FILLED ORAL at 10:09

## 2022-07-01 RX ADMIN — KETOROLAC TROMETHAMINE 15 MG: 15 INJECTION, SOLUTION INTRAMUSCULAR; INTRAVENOUS at 05:42

## 2022-07-01 RX ADMIN — SODIUM CHLORIDE, PRESERVATIVE FREE 10 ML: 5 INJECTION INTRAVENOUS at 22:15

## 2022-07-01 RX ADMIN — SODIUM CHLORIDE, PRESERVATIVE FREE 10 ML: 5 INJECTION INTRAVENOUS at 14:05

## 2022-07-01 RX ADMIN — SODIUM CHLORIDE, PRESERVATIVE FREE 10 ML: 5 INJECTION INTRAVENOUS at 05:42

## 2022-07-01 RX ADMIN — ACETAMINOPHEN 650 MG: 325 TABLET ORAL at 02:42

## 2022-07-01 RX ADMIN — HYDROMORPHONE HYDROCHLORIDE 4 MG: 4 TABLET ORAL at 04:28

## 2022-07-01 RX ADMIN — LEVOFLOXACIN 500 MG: 500 INJECTION, SOLUTION INTRAVENOUS at 22:15

## 2022-07-01 RX ADMIN — POTASSIUM CHLORIDE, DEXTROSE MONOHYDRATE AND SODIUM CHLORIDE 75 ML/HR: 150; 5; 900 INJECTION, SOLUTION INTRAVENOUS at 22:18

## 2022-07-01 RX ADMIN — PIPERACILLIN AND TAZOBACTAM 4.5 G: 4; .5 INJECTION, POWDER, FOR SOLUTION INTRAVENOUS at 02:42

## 2022-07-01 NOTE — ROUTINE PROCESS
Bedside shift change report given to Nilsa Alejandre rn (oncoming nurse) by Sam Yi (offgoing nurse). Report included the following information SBAR and Kardex.

## 2022-07-01 NOTE — PROGRESS NOTES
Problem: Pain  Goal: *Control of Pain  7/1/2022 0139 by Rosalinda Chavis RN  Outcome: Progressing Towards Goal  7/1/2022 0138 by Rosalinda Chavis RN  Outcome: Progressing Towards Goal  7/1/2022 0138 by Rosalinda Chavis RN  Outcome: Progressing Towards Goal     Problem: Patient Education: Go to Patient Education Activity  Goal: Patient/Family Education  7/1/2022 0139 by Rosalinda Chavis RN  Outcome: Progressing Towards Goal  7/1/2022 0138 by Rosalinda Chavis RN  Outcome: Progressing Towards Goal  7/1/2022 0138 by Rosalinda Chavis RN  Outcome: Progressing Towards Goal     Problem: Falls - Risk of  Goal: *Absence of Falls  Description: Document Michael Reason Fall Risk and appropriate interventions in the flowsheet.   7/1/2022 0139 by Rosalinda Chavis RN  Outcome: Progressing Towards Goal  Note: Fall Risk Interventions:  Mobility Interventions: Assess mobility with egress test,Communicate number of staff needed for ambulation/transfer,OT consult for ADLs,Patient to call before getting OOB         Medication Interventions: Assess postural VS orthostatic hypotension,Evaluate medications/consider consulting pharmacy    Elimination Interventions: Call light in reach,Elevated toilet seat,Patient to call for help with toileting needs,Stay With Me (per policy),Toilet paper/wipes in reach,Toileting schedule/hourly rounds           7/1/2022 0138 by Rosalinda Chavis RN  Outcome: Progressing Towards Goal  Note: Fall Risk Interventions:  Mobility Interventions: Assess mobility with egress test,Communicate number of staff needed for ambulation/transfer,OT consult for ADLs,Patient to call before getting OOB         Medication Interventions: Assess postural VS orthostatic hypotension,Evaluate medications/consider consulting pharmacy    Elimination Interventions: Call light in reach,Elevated toilet seat,Patient to call for help with toileting needs,Stay With Me (per policy),Toilet paper/wipes in reach,Toileting schedule/hourly rounds           7/1/2022 0138 by Brandon Looney RN  Outcome: Progressing Towards Goal  Note: Fall Risk Interventions:  Mobility Interventions: Assess mobility with egress test,Communicate number of staff needed for ambulation/transfer,OT consult for ADLs,Patient to call before getting OOB         Medication Interventions: Assess postural VS orthostatic hypotension,Evaluate medications/consider consulting pharmacy    Elimination Interventions: Call light in reach,Elevated toilet seat,Patient to call for help with toileting needs,Stay With Me (per policy),Toilet paper/wipes in reach,Toileting schedule/hourly rounds              Problem: Patient Education: Go to Patient Education Activity  Goal: Patient/Family Education  7/1/2022 0139 by Brandon Looney RN  Outcome: Progressing Towards Goal  7/1/2022 0138 by Brandon Looney RN  Outcome: Progressing Towards Goal  7/1/2022 0138 by Brandon Looney RN  Outcome: Progressing Towards Goal     Problem: Surgical Pathway Day of Surgery  Goal: Consults, if ordered  7/1/2022 0139 by Brandon Looney RN  Outcome: Progressing Towards Goal  7/1/2022 0138 by Brandon Looney RN  Outcome: Progressing Towards Goal  7/1/2022 0138 by Brandon Looney RN  Outcome: Progressing Towards Goal  Goal: Nutrition/Diet  7/1/2022 0139 by Brandon Looney RN  Outcome: Progressing Towards Goal  7/1/2022 0138 by Brandon Looney RN  Outcome: Progressing Towards Goal  7/1/2022 0138 by Brandon Looney RN  Outcome: Progressing Towards Goal  Goal: Medications  7/1/2022 0139 by Brandon Looney RN  Outcome: Progressing Towards Goal  7/1/2022 0138 by Brandon Looney RN  Outcome: Progressing Towards Goal  7/1/2022 0138 by Brandon Looney RN  Outcome: Progressing Towards Goal  Goal: Respiratory  7/1/2022 0139 by Brandon Looney RN  Outcome: Progressing Towards Goal  7/1/2022 0138 by Brandon Looney RN  Outcome: Progressing Towards Goal  7/1/2022 0138 by Brandon Looney RN  Outcome: Progressing Towards Goal  Goal: Treatments/Interventions/Procedures  7/1/2022 0139 by Jacqueline Thornton RN  Outcome: Progressing Towards Goal  7/1/2022 0138 by Jacqueline Thornton RN  Outcome: Progressing Towards Goal  7/1/2022 0138 by Jacqueline Thornton RN  Outcome: Progressing Towards Goal  Goal: Psychosocial  7/1/2022 0139 by Jacqueline Thornton RN  Outcome: Progressing Towards Goal  7/1/2022 0138 by Jacqueline Thornton RN  Outcome: Progressing Towards Goal  7/1/2022 0138 by Jacqueline Thornton RN  Outcome: Progressing Towards Goal  Goal: *No signs and symptoms of infection or wound complications  3/6/5932 1638 by Jacqueline Thornton RN  Outcome: Progressing Towards Goal  7/1/2022 0138 by Jacqueline Thornton RN  Outcome: Progressing Towards Goal  7/1/2022 0138 by Jacqueline Thornton RN  Outcome: Progressing Towards Goal

## 2022-07-01 NOTE — PROGRESS NOTES
Kevin Rowland M.D. FACS  PROGRESS NOTE    Name: Salina Yun MRN: [id number]   : 1980 Hospital: [facility]   Date: 2022 Admission Date: 2022  8:01 PM     Hospital Day: 8  6 Days Post-Op  Subjective:  Patient is accompanied by his mother in the room. At our first meeting today he was sitting upright in bedside chair in his pajamas. At our second meeting he was in bed. He appears to be feeling better. He continues to complain about the food. He was eating grapes earlier. No nausea vomiting with the grapes. Continues to have bowel movements and pass gas. Objective:  Vitals:    22 1545 22 2323 22 0428   BP: (!) 152/83 130/79 138/72 108/70   Pulse: 61 84 (!) 57 81   Resp: 18 18 18 18   Temp: 98.9 °F (37.2 °C) 98.9 °F (37.2 °C) 98.3 °F (36.8 °C) 98.6 °F (37 °C)   SpO2: 98% 97% 97% 95%   Weight:         Date 22 0700 - 22 0659 22 07 - 22 0659   Shift 0841-5886 0098-1281 24 Hour Total 9654-4353 9967-5761 24 Hour Total   INTAKE   P.O. 700 240 940        P. O. 700 240 940      I. V.(mL/kg/hr)  870(0.9) 870(0.4)        Volume (dextrose 5% - 0.9% NaCl with KCl 20 mEq/L infusion)  470 470        Volume (levoFLOXacin (LEVAQUIN) 500 mg in D5W IVPB)  100 100        Volume (piperacillin-tazobactam (ZOSYN) 4.5 g in 0.9% sodium chloride (MBP/ADV) 100 mL MBP)  300 300      Shift Total(mL/kg) 700(8.6) 1110(13.7) 1810(22.3)      OUTPUT   Urine(mL/kg/hr)  400(0.4) 400(0.2)        Urine Voided  400 400        Urine Occurrence(s)  1 x 1 x      Shift Total(mL/kg)  400(4.9) 400(4.9)       326 9433      Weight (kg) 81.3 81.3 81.3 81.3 81.3 81.3     Physical Exam:    General: Awake and alert, oriented x4, no apparent distress   Abdomen: abdomen is soft with incisional tenderness. Small amount of induration around the left lower quadrant incision but no fluctuance or crepitance. Incision(s) are C/D/I.   No masses, organomegaly or guarding   Extremities: No c/c/e BLE  Labs:  Recent Results (from the past 24 hour(s))   CBC WITH AUTOMATED DIFF    Collection Time: 07/01/22  9:59 AM   Result Value Ref Range    WBC 12.2 4.6 - 13.2 K/uL    RBC 4.14 (L) 4.35 - 5.65 M/uL    HGB 11.3 (L) 13.0 - 16.0 g/dL    HCT 34.6 (L) 36.0 - 48.0 %    MCV 83.6 78.0 - 100.0 FL    MCH 27.3 24.0 - 34.0 PG    MCHC 32.7 31.0 - 37.0 g/dL    RDW 13.7 11.6 - 14.5 %    PLATELET 032 440 - 649 K/uL    MPV 9.0 (L) 9.2 - 11.8 FL    NRBC 0.0 0  WBC    ABSOLUTE NRBC 0.00 0.00 - 0.01 K/uL    NEUTROPHILS 81 (H) 40 - 73 %    LYMPHOCYTES 6 (L) 21 - 52 %    MONOCYTES 6 3 - 10 %    EOSINOPHILS 2 0 - 5 %    BASOPHILS 0 0 - 2 %    IMMATURE GRANULOCYTES 4 (H) 0.0 - 0.5 %    ABS. NEUTROPHILS 9.9 (H) 1.8 - 8.0 K/UL    ABS. LYMPHOCYTES 0.7 (L) 0.9 - 3.6 K/UL    ABS. MONOCYTES 0.8 0.05 - 1.2 K/UL    ABS. EOSINOPHILS 0.2 0.0 - 0.4 K/UL    ABS. BASOPHILS 0.1 0.0 - 0.1 K/UL    ABS. IMM.  GRANS. 0.5 (H) 0.00 - 0.04 K/UL    DF AUTOMATED     METABOLIC PANEL, BASIC    Collection Time: 07/01/22  9:59 AM   Result Value Ref Range    Sodium 135 (L) 136 - 145 mmol/L    Potassium 4.1 3.5 - 5.5 mmol/L    Chloride 99 (L) 100 - 111 mmol/L    CO2 30 21 - 32 mmol/L    Anion gap 6 3.0 - 18 mmol/L    Glucose 94 74 - 99 mg/dL    BUN 6 (L) 7.0 - 18 MG/DL    Creatinine 0.81 0.6 - 1.3 MG/DL    BUN/Creatinine ratio 7 (L) 12 - 20      GFR est AA >60 >60 ml/min/1.73m2    GFR est non-AA >60 >60 ml/min/1.73m2    Calcium 8.4 (L) 8.5 - 10.1 MG/DL   PHOSPHORUS    Collection Time: 07/01/22  9:59 AM   Result Value Ref Range    Phosphorus 3.1 2.5 - 4.9 MG/DL   MAGNESIUM    Collection Time: 07/01/22  9:59 AM   Result Value Ref Range    Magnesium 2.1 1.6 - 2.6 mg/dL   HEPATIC FUNCTION PANEL    Collection Time: 07/01/22  9:59 AM   Result Value Ref Range    Protein, total 6.9 6.4 - 8.2 g/dL    Albumin 2.2 (L) 3.4 - 5.0 g/dL    Globulin 4.7 (H) 2.0 - 4.0 g/dL    A-G Ratio 0.5 (L) 0.8 - 1.7      Bilirubin, total 0.4 0.2 - 1.0 MG/DL    Bilirubin, direct 0.2 0.0 - 0.2 MG/DL    Alk. phosphatase 140 (H) 45 - 117 U/L    AST (SGOT) 73 (H) 10 - 38 U/L    ALT (SGPT) 64 (H) 16 - 61 U/L   PREALBUMIN    Collection Time: 07/01/22  9:59 AM   Result Value Ref Range    Prealbumin 9.2 (L) 20 - 40 MG/DL     All Micro Results     Procedure Component Value Units Date/Time    CULTURE, BLOOD [[id number]] Collected: 06/24/22 2245    Order Status: Completed Specimen: Blood Updated: 06/30/22 0636     Special Requests: NO SPECIAL REQUESTS        Culture result: NO GROWTH 6 DAYS       CULTURE, BLOOD [[id number]] Collected: 06/24/22 2230    Order Status: Completed Specimen: Blood Updated: 06/30/22 0636     Special Requests: NO SPECIAL REQUESTS        Culture result: NO GROWTH 6 DAYS       CULTURE, BODY FLUID W GRAM STAIN [[id number]]  (Abnormal)  (Susceptibility) Collected: 06/25/22 0834    Order Status: Completed Specimen: Fluid Updated: 06/28/22 1026     Special Requests: NO SPECIAL REQUESTS        GRAM STAIN RARE WBCS SEEN         NO ORGANISMS SEEN        Culture result: HEAVY KLEBSIELLA OXYTOCA         FEW ALPHA STREPTOCOCCUS       CULTURE, ANAEROBIC [[id number]] Collected: 06/25/22 0824    Order Status: Completed Specimen: Surgical Specimen Updated: 06/27/22 1223     Special Requests: NO SPECIAL REQUESTS        Culture result: NO ANAEROBES ISOLATED       CULTURE, TISSUE W Torrie Riedel [[id number]] Collected: 06/25/22 0830    Order Status: Canceled Specimen: Appendix     COVID-19 RAPID TEST [[id number]] Collected: 06/24/22 2250    Order Status: Completed Specimen: Nasopharyngeal Updated: 06/24/22 2327     Specimen source Nasopharyngeal        COVID-19 rapid test Not detected        Comment: Rapid Abbott ID Now       Rapid NAAT:  The specimen is NEGATIVE for SARS-CoV-2, the novel coronavirus associated with COVID-19.        Negative results should be treated as presumptive and, if inconsistent with clinical signs and symptoms or necessary for patient management, should be tested with an alternative molecular assay. Negative results do not preclude SARS-CoV-2 infection and should not be used as the sole basis for patient management decisions. This test has been authorized by the FDA under an Emergency Use Authorization (EUA) for use by authorized laboratories.    Fact sheet for Healthcare Providers: iTendency.uy  Fact sheet for Patients: iTendency.uy       Methodology: Isothermal Nucleic Acid Amplification             Current Medications:  Current Facility-Administered Medications   Medication Dose Route Frequency Provider Last Rate Last Admin    dextrose 5% - 0.9% NaCl with KCl 20 mEq/L infusion  75 mL/hr IntraVENous CONTINUOUS Traci Sotomayor MD 75 mL/hr at 06/30/22 2059 75 mL/hr at 06/30/22 2059    docusate sodium (COLACE) capsule 100 mg  100 mg Oral BID Traci Sotomayor MD   100 mg at 06/30/22 2032    bisacodyL (DULCOLAX) suppository 10 mg  10 mg Rectal DAILY PRN Traci Sotomayor MD        HYDROmorphone (DILAUDID) tablet 4 mg  4 mg Oral Q6H PRN Traci Sotomayor MD   4 mg at 07/01/22 0428    ketorolac (TORADOL) injection 15 mg  15 mg IntraVENous Q8H Traci Sotomayor MD   15 mg at 07/01/22 0542    levoFLOXacin (LEVAQUIN) 500 mg in D5W IVPB  500 mg IntraVENous Q24H Becca CASAS  mL/hr at 06/30/22 2235 500 mg at 06/30/22 2235    piperacillin-tazobactam (ZOSYN) 4.5 g in 0.9% sodium chloride (MBP/ADV) 100 mL MBP  4.5 g IntraVENous Q8H Becca CASAS MD 25 mL/hr at 07/01/22 0242 4.5 g at 07/01/22 0242    sodium chloride (NS) flush 5-40 mL  5-40 mL IntraVENous Q8H Traci Sotomayor MD   10 mL at 07/01/22 0542    sodium chloride (NS) flush 5-40 mL  5-40 mL IntraVENous PRN Traci Sotomayor MD        acetaminophen (TYLENOL) tablet 650 mg  650 mg Oral Q6H rTaci Sotomayor MD   650 mg at 07/01/22 0242    naloxone [facility]) injection 0.4 mg  0.4 mg IntraVENous PRN Jarrett Yves Jenifer Hutson MD        ondansetron [facility]) injection 4 mg  4 mg IntraVENous Q4H PRN Hero Coto MD   4 mg at 06/27/22 2058    diphenhydrAMINE (BENADRYL) injection 12.5 mg  12.5 mg IntraVENous Q4H PRN Hero Coto MD        enoxaparin (LOVENOX) injection 40 mg  40 mg SubCUTAneous Q24H Hero Coto MD   40 mg at 06/30/22 1636     IMPRESSION:   · Patient is postop day 6 from laparoscopic appendectomy with drainage of abscess for acute perforated appendicitis with abscess. PLAN:/DISCUSION:   · Resolving sepsis-we will convert to p.o. antibiotics per Dr. Chelly Lawrence upon discharge  · Resolving ileus-encourage p.o. intake  · Severe protein calorie malnutrition with prealbumin 9.2 and albumin 2.2-encourage p.o. intake and supplements.   · Encourage incentive spirometry usage, out of bed to chair, ambulate in hallway  · SCDs to bilateral lower extremities when in bed, Lovenox 40 mg subcu        Tyrese Perkins MD

## 2022-07-01 NOTE — PROGRESS NOTES
Comprehensive Nutrition Assessment    Type and Reason for Visit: Initial,RD nutrition re-screen/LOS    Nutrition Recommendations/Plan:   1. Plan to add Ensure Clear TID to promote PO intake   2. Plan to add Magic Cup BID to promote PO intake   3. Continue to monitor PO intake, compliance with supplement/diet, labs, weight and plan of care      Malnutrition Assessment:  Malnutrition Status: At risk for malnutrition (specify) (r/t poor PO intake - refusing meals) (07/01/22 1326)      Nutrition History and Allergies:   Past medical history of: ruptured appendicitis and acute perforated appendicitis. NKFA. Weight history per chart review:  CBW: 06/28/22 : 81.3 kg (179 lb 3.2 oz). Nutrition Assessment:    Visited pt in room, sitting up in chair, displays discomfort-mother in room visually concerned about pt. Pt admitted for Acute perforated appendicitis with abscess- postop day 5 from laparoscopic appendectomy per MD note. Patients mother reported pt not eating at all and she was encouraging sips of water/juice. Pt unable to provide much information for assessment-initially refused oral supplements. Discussed the importance of trying to initiate PO intake slowly. Per chart review, no recent experience with emesis and stool remains loose. Hx of refusing meals and PO intake is 1-25%; very minimal-only consumed jello this morning. Patients mother asked about feeding tubes and pt refused. Encouraged to try oral supplements in small amounts and pt was receptive. Nutrition Related Findings:    Last BM 6/30. Output: 400 ml (urine). Pertinent medications: Reviewed: Dulcolax, colace, lovenox, Zofran. Current Nutrition Intake & Therapies:  Average Meal Intake: 1-25%  Average Supplement Intake: None ordered  ADULT DIET Regular    Anthropometric Measures:  Height: 6' (182.9 cm)  Ideal Body Weight (IBW): 178 lbs (81 kg)     Current Body Wt:  81.3 kg (179 lb 3.7 oz), 100.7 % IBW.  Standing scale  Current BMI (kg/m2): 24.3  BMI Category: Normal weight (BMI 18.5-24. 9)    Estimated Daily Nutrient Needs:  Energy Requirements Based On: Kcal/kg (25-30)  Weight Used for Energy Requirements: Current  Energy (kcal/day): 6175-0044  Weight Used for Protein Requirements: Current (0.8-1.0)  Protein (g/day): 65-81  Method Used for Fluid Requirements: 1 ml/kcal  Fluid (ml/day): 0956-3344    Nutrition Diagnosis:   · Inadequate oral intake related to altered GI structure,altered GI function,early satiety as evidenced by poor intake prior to admission,intake 0-25%      Nutrition Interventions:   Food and/or Nutrient Delivery: Continue current diet,Start oral nutrition supplement  Nutrition Education/Counseling: Education not indicated,No recommendations at this time  Coordination of Nutrition Care: Continue to monitor while inpatient  Plan of Care discussed with: patient and family    Goals:     Goals: Meet at least 75% of estimated needs,by next RD assessment       Nutrition Monitoring and Evaluation:   Behavioral-Environmental Outcomes: None identified  Food/Nutrient Intake Outcomes: Diet advancement/tolerance,Food and nutrient intake,Supplement intake  Physical Signs/Symptoms Outcomes: Biochemical data,Meal time behavior,Nutrition focused physical findings,Weight,GI status    Discharge Planning:     Too soon to determine    Trang Winston, CRISTHIANN, LD   Contact: [phone]

## 2022-07-01 NOTE — PROGRESS NOTES
Infectious Disease progress Note        Reason: Perforated appendicitis, abdominal abscess, worsening leukocytosis    Current abx Prior abx   Pip/tazo since 6/24  Levofloxacin since 6/28   Vancomycin 6/28     Lines:       Assessment :    42-year-old male with no significant past medical history presented to the emergency department on 6/24/2022 with complaints of worsening right lower quadrant pain. Ct abd/pelvis without contrast 6/21-Colitis from the cecal tip to the distal transverse colon. Mild  inflammation/edema at the level of the cecum. Ct abd/pelvis with contrast 6/24- Acute appendicitis with evidence of perforation at the tip. No  definable/measurable discrete abscess. Clinical presentation consistent with acute perforated appendicitis with abscess status post laparoscopic appendectomy on 6/25/2022    Intra-Op cultures 6/25-Klebsiella (cefazolin SUSAN 16, ampicillin/sulbactam SUSAN 8, resistant to ampicillin), alpha Streptococcus    Worsening leukocytosis likely combination of postoperative inflammation, partially treated infection due to evolving beta-lactam resistance. Susceptibility pattern of Klebsiella concerning for this. Hence levofloxacin added 6/28  No definitive abscess or undrained infection noted on CT scan 6/20/2022    Improved leukocytosis. Afebrile. Blanching left lower abdominal wall erythema surrounding recent drain site noted on today's exam-monitor for subcutaneous abscess    Recommendations:    1. Continue  Levofloxacin. D/c zosyn. Start iv metronidazole  2. Follow-up surgery recommendations  3. Advance diet per surgery  4. Monitor left lower abdominal wall erythema  5. Will switch to po levofloxacin,metronidazole till 7/7/22 if continued clinical/lab improvement, patient able to tolerate po diet. Above plan was discussed in details with patient, dr. Pato Osborne. Please call me if any further questions or concerns.  Will continue to participate in the care of this patient. HPI:  Poor appetite. Denies worsening abdominal pain, nausea, vomiting. Has some pain LL abdomen. Past Medical History:   Diagnosis Date    S/P appy 06/25/2022    acute ruptured       No past surgical history on file. Home Medication List    Details   amoxicillin-clavulanate (Augmentin) 875-125 mg per tablet Take 1 Tablet by mouth two (2) times a day for 10 days. Qty: 20 Tablet, Refills: 0      dicyclomine (BENTYL) 10 mg capsule Take 1 Capsule by mouth two (2) times a day. Qty: 14 Capsule, Refills: 0      ondansetron hcl (Zofran) 4 mg tablet Take 1 Tablet by mouth every eight (8) hours as needed for Nausea. Qty: 14 Tablet, Refills: 0          Current Facility-Administered Medications   Medication Dose Route Frequency    dextrose 5% - 0.9% NaCl with KCl 20 mEq/L infusion  75 mL/hr IntraVENous CONTINUOUS    docusate sodium (COLACE) capsule 100 mg  100 mg Oral BID    bisacodyL (DULCOLAX) suppository 10 mg  10 mg Rectal DAILY PRN    HYDROmorphone (DILAUDID) tablet 4 mg  4 mg Oral Q6H PRN    ketorolac (TORADOL) injection 15 mg  15 mg IntraVENous Q8H    levoFLOXacin (LEVAQUIN) 500 mg in D5W IVPB  500 mg IntraVENous Q24H    piperacillin-tazobactam (ZOSYN) 4.5 g in 0.9% sodium chloride (MBP/ADV) 100 mL MBP  4.5 g IntraVENous Q8H    sodium chloride (NS) flush 5-40 mL  5-40 mL IntraVENous Q8H    sodium chloride (NS) flush 5-40 mL  5-40 mL IntraVENous PRN    acetaminophen (TYLENOL) tablet 650 mg  650 mg Oral Q6H    naloxone (NARCAN) injection 0.4 mg  0.4 mg IntraVENous PRN    ondansetron (ZOFRAN) injection 4 mg  4 mg IntraVENous Q4H PRN    diphenhydrAMINE (BENADRYL) injection 12.5 mg  12.5 mg IntraVENous Q4H PRN    enoxaparin (LOVENOX) injection 40 mg  40 mg SubCUTAneous Q24H       Allergies: Patient has no known allergies. No family history on file.   Social History     Socioeconomic History    Marital status: SINGLE     Spouse name: Not on file    Number of children: Not on file  Years of education: Not on file    Highest education level: Not on file   Occupational History    Not on file   Tobacco Use    Smoking status: Not on file    Smokeless tobacco: Not on file   Substance and Sexual Activity    Alcohol use: Not on file    Drug use: Not on file    Sexual activity: Not on file   Other Topics Concern    Not on file   Social History Narrative    Not on file     Social Determinants of Health     Financial Resource Strain:     Difficulty of Paying Living Expenses: Not on file   Food Insecurity:     Worried About Running Out of Food in the Last Year: Not on file    Frank of Food in the Last Year: Not on file   Transportation Needs:     Lack of Transportation (Medical): Not on file    Lack of Transportation (Non-Medical):  Not on file   Physical Activity:     Days of Exercise per Week: Not on file    Minutes of Exercise per Session: Not on file   Stress:     Feeling of Stress : Not on file   Social Connections:     Frequency of Communication with Friends and Family: Not on file    Frequency of Social Gatherings with Friends and Family: Not on file    Attends [employer] Services: Not on file    Active Member of [address] [place] [address] or Organizations: Not on file    Attends Club or Organization Meetings: Not on file    Marital Status: Not on file   Intimate Partner Violence:     Fear of Current or Ex-Partner: Not on file    Emotionally Abused: Not on file    Physically Abused: Not on file    Sexually Abused: Not on file   Housing Stability:     Unable to Pay for Housing in the Last Year: Not on file    Number of [place] in the Last Year: Not on file    Unstable Housing in the Last Year: Not on file     Social History     Tobacco Use   Smoking Status Not on file   Smokeless Tobacco Not on file        Temp (24hrs), Av.6 °F (37 °C), Min:98.2 °F (36.8 °C), Max:98.9 °F (37.2 °C)    Visit Vitals  /71   Pulse 69   Temp 98.2 °F (36.8 °C)   Resp 15   Wt 81.3 kg (179 lb 3.2 oz)   SpO2 95%   BMI 24.30 kg/m²       ROS: 12 point ROS obtained in details. Pertinent positives as mentioned in HPI,   otherwise negative    Physical Exam:    General: Well developed, well nourished male laying on the bed AAOx3 in no acute distress. General:   awake alert and oriented   HEENT:  Normocephalic, atraumatic, EOMI, no scleral icterus or pallor; no conjunctival hemmohage;  nasal and oral mucous are moist and without evidence of lesions. Neck supple, no bruits. Lymph Nodes:   not examined   Lungs:   non-labored, bilateral chest movements equal   Heart:  RRR, s1 and s2; no rubs or gallops, no edema   Abdomen:  soft, distended, active bowel sounds, no hepatomegaly, no splenomegaly, improved lower abdominal tenderness-no guarding, rigidity, blanching erythema left lower abdominal wall surrounding the previous drain site-no significant induration   Genitourinary:  deferred   Extremities:   no clubbing, cyanosis; no joint effusions or swelling; Full ROM of all large joints to the upper and lower extremities; muscle mass appropriate for age   Neurologic:  No gross focal sensory or motor abnormalities; Speech appropriate. Cranial nerves intact                        Skin:  Surgical changes on abdomen   Back:  not examined     Psychiatric:  Blunted affect         Labs: Results:   Chemistry Recent Labs     06/29/22  0505 06/28/22  0945   GLU 87 102*    133*   K 3.4* 3.2*   CL 99* 100   CO2 30 30   BUN 10 13   CREA 0.89 0.88   CA 8.1* 7.8*   AGAP 7 3   BUCR 11* 15      CBC w/Diff Recent Labs     06/29/22  0505   WBC 12.7   RBC 4.30*   HGB 11.6*   HCT 35.3*      GRANS 79*   LYMPH 7*   EOS 2      Microbiology No results for input(s): CULT in the last 72 hours.        RADIOLOGY:    All available imaging studies/reports in [facility] care for this admission were reviewed      Disclaimer: Sections of this note are dictated utilizing voice recognition software, which may have resulted in some phonetic based errors in grammar and contents. Even though attempts were made to correct all the mistakes, some may have been missed, and remained in the body of the document. If questions arise, please contact our department.     Dr. Raghav Hutton, Infectious Disease Specialist  [phone]  July 1, 2022  4:00 PM

## 2022-07-01 NOTE — PROGRESS NOTES
Pt was encouraged to try and push more. Pt's mom is crying and having an anxiety  Attack at the bedside. Really worrying about the pt. Mom was escorted out the room. Pt was encouraged to get up and try and move around. Pt was offered a mask and he independently waked around the unit with this nurse. We talked about hobbies etc. Pt was escorted back to the room and ask if I could get him anything else and he stated no. Pt stated he has some discomfort to his side and he was given his scheduled tylenol. Will continue to monitor.

## 2022-07-01 NOTE — PROGRESS NOTES
Problem: Pain  Goal: *Control of Pain  Outcome: Progressing Towards Goal     Problem: Patient Education: Go to Patient Education Activity  Goal: Patient/Family Education  Outcome: Progressing Towards Goal     Problem: Falls - Risk of  Goal: *Absence of Falls  Description: Document Salud Gomez Fall Risk and appropriate interventions in the flowsheet.   Outcome: Progressing Towards Goal  Note: Fall Risk Interventions:  Mobility Interventions: Assess mobility with egress test,Communicate number of staff needed for ambulation/transfer,OT consult for ADLs,Patient to call before getting OOB         Medication Interventions: Assess postural VS orthostatic hypotension,Evaluate medications/consider consulting pharmacy    Elimination Interventions: Call light in reach,Elevated toilet seat,Patient to call for help with toileting needs,Stay With Me (per policy),Toilet paper/wipes in reach,Toileting schedule/hourly rounds              Problem: Patient Education: Go to Patient Education Activity  Goal: Patient/Family Education  Outcome: Progressing Towards Goal     Problem: Surgical Pathway Day of Surgery  Goal: Consults, if ordered  Outcome: Progressing Towards Goal  Goal: Nutrition/Diet  Outcome: Progressing Towards Goal

## 2022-07-01 NOTE — PROGRESS NOTES
Problem: Pain  Goal: *Control of Pain  Outcome: Progressing Towards Goal     Problem: Patient Education: Go to Patient Education Activity  Goal: Patient/Family Education  Outcome: Progressing Towards Goal     Problem: Falls - Risk of  Goal: *Absence of Falls  Description: Document Hailee Rai Fall Risk and appropriate interventions in the flowsheet.   Outcome: Progressing Towards Goal  Note: Fall Risk Interventions:  Mobility Interventions: Assess mobility with egress test,Communicate number of staff needed for ambulation/transfer,OT consult for ADLs,Patient to call before getting OOB         Medication Interventions: Assess postural VS orthostatic hypotension,Evaluate medications/consider consulting pharmacy    Elimination Interventions: Call light in reach,Elevated toilet seat,Patient to call for help with toileting needs,Stay With Me (per policy),Toilet paper/wipes in reach,Toileting schedule/hourly rounds              Problem: Patient Education: Go to Patient Education Activity  Goal: Patient/Family Education  Outcome: Progressing Towards Goal     Problem: Surgical Pathway Day of Surgery  Goal: Consults, if ordered  Outcome: Progressing Towards Goal  Goal: Nutrition/Diet  Outcome: Progressing Towards Goal  Goal: Medications  Outcome: Progressing Towards Goal  Goal: Respiratory  Outcome: Progressing Towards Goal  Goal: Treatments/Interventions/Procedures  Outcome: Progressing Towards Goal  Goal: Psychosocial  Outcome: Progressing Towards Goal  Goal: *No signs and symptoms of infection or wound complications  Outcome: Progressing Towards Goal

## 2022-07-01 NOTE — PROGRESS NOTES
Bedside and Verbal shift change report given to Michel Hughes RN (oncoming nurse) by Elder Bui RN (offgoing nurse). Report included the following information SBAR, Kardex, Intake/Output and MAR.

## 2022-07-02 VITALS
HEART RATE: 77 BPM | OXYGEN SATURATION: 97 % | RESPIRATION RATE: 16 BRPM | SYSTOLIC BLOOD PRESSURE: 111 MMHG | WEIGHT: 179.2 LBS | TEMPERATURE: 97.8 F | DIASTOLIC BLOOD PRESSURE: 76 MMHG | BODY MASS INDEX: 24.27 KG/M2 | HEIGHT: 72 IN

## 2022-07-02 LAB
BASOPHILS # BLD: 0 K/UL (ref 0–0.1)
BASOPHILS NFR BLD: 0 % (ref 0–2)
DIFFERENTIAL METHOD BLD: ABNORMAL
EOSINOPHIL # BLD: 0.2 K/UL (ref 0–0.4)
EOSINOPHIL NFR BLD: 2 % (ref 0–5)
ERYTHROCYTE [DISTWIDTH] IN BLOOD BY AUTOMATED COUNT: 13.8 % (ref 11.6–14.5)
HCT VFR BLD AUTO: 33.1 % (ref 36–48)
HGB BLD-MCNC: 11 G/DL (ref 13–16)
IMM GRANULOCYTES # BLD AUTO: 0.4 K/UL (ref 0–0.04)
IMM GRANULOCYTES NFR BLD AUTO: 4 % (ref 0–0.5)
LYMPHOCYTES # BLD: 0.9 K/UL (ref 0.9–3.6)
LYMPHOCYTES NFR BLD: 9 % (ref 21–52)
MCH RBC QN AUTO: 27 PG (ref 24–34)
MCHC RBC AUTO-ENTMCNC: 33.2 G/DL (ref 31–37)
MCV RBC AUTO: 81.3 FL (ref 78–100)
MONOCYTES # BLD: 0.7 K/UL (ref 0.05–1.2)
MONOCYTES NFR BLD: 8 % (ref 3–10)
NEUTS SEG # BLD: 7.4 K/UL (ref 1.8–8)
NEUTS SEG NFR BLD: 77 % (ref 40–73)
NRBC # BLD: 0 K/UL (ref 0–0.01)
NRBC BLD-RTO: 0 PER 100 WBC
PLATELET # BLD AUTO: 369 K/UL (ref 135–420)
PMV BLD AUTO: 8.9 FL (ref 9.2–11.8)
RBC # BLD AUTO: 4.07 M/UL (ref 4.35–5.65)
WBC # BLD AUTO: 9.5 K/UL (ref 4.6–13.2)

## 2022-07-02 PROCEDURE — 36415 COLL VENOUS BLD VENIPUNCTURE: CPT

## 2022-07-02 PROCEDURE — 74011000250 HC RX REV CODE- 250: Performed by: SURGERY

## 2022-07-02 PROCEDURE — 74011250636 HC RX REV CODE- 250/636: Performed by: INTERNAL MEDICINE

## 2022-07-02 PROCEDURE — 85025 COMPLETE CBC W/AUTO DIFF WBC: CPT

## 2022-07-02 PROCEDURE — 74011250637 HC RX REV CODE- 250/637: Performed by: SURGERY

## 2022-07-02 PROCEDURE — 74011250636 HC RX REV CODE- 250/636: Performed by: SURGERY

## 2022-07-02 RX ORDER — ACETAMINOPHEN 325 MG/1
650 TABLET ORAL EVERY 6 HOURS
Qty: 56 TABLET | Refills: 1 | Status: SHIPPED | OUTPATIENT
Start: 2022-07-02

## 2022-07-02 RX ORDER — HYDROMORPHONE HYDROCHLORIDE 2 MG/1
2 TABLET ORAL
Qty: 20 TABLET | Refills: 0 | Status: SHIPPED | OUTPATIENT
Start: 2022-07-02 | End: 2022-07-05

## 2022-07-02 RX ORDER — DOCUSATE SODIUM 100 MG/1
100 CAPSULE, LIQUID FILLED ORAL 2 TIMES DAILY
Qty: 60 CAPSULE | Refills: 2 | Status: SHIPPED | OUTPATIENT
Start: 2022-07-02 | End: 2022-09-30

## 2022-07-02 RX ORDER — IBUPROFEN 600 MG/1
600 TABLET ORAL EVERY 6 HOURS
Qty: 28 TABLET | Refills: 0 | Status: SHIPPED | OUTPATIENT
Start: 2022-07-02

## 2022-07-02 RX ADMIN — METRONIDAZOLE 500 MG: 500 INJECTION, SOLUTION INTRAVENOUS at 05:20

## 2022-07-02 RX ADMIN — ACETAMINOPHEN 650 MG: 325 TABLET ORAL at 08:18

## 2022-07-02 RX ADMIN — SODIUM CHLORIDE, PRESERVATIVE FREE 10 ML: 5 INJECTION INTRAVENOUS at 05:03

## 2022-07-02 RX ADMIN — DOCUSATE SODIUM 100 MG: 100 CAPSULE, LIQUID FILLED ORAL at 08:18

## 2022-07-02 RX ADMIN — KETOROLAC TROMETHAMINE 15 MG: 15 INJECTION, SOLUTION INTRAMUSCULAR; INTRAVENOUS at 05:20

## 2022-07-02 NOTE — PROGRESS NOTES
Pt is alert and oriented and able to make needs known. No s/s of any pain or discomfort. Discharge instructions reviewed in full detail with the patient. Opportunity give for questions and answers provided. All belongings are with the patient. Pt was wheeled down in  the wheelchair. Pt is discharged in stable condition.

## 2022-07-02 NOTE — PROGRESS NOTES
D/C order noted for today. Orders reviewed. No needs identified at this time. Mom will transport home. CM remains available if needed.       RUDY CabezasN, RN  Care Management  Pager # [phone]

## 2022-07-02 NOTE — PROGRESS NOTES
Kevin Hsieh M.D. FACS  PROGRESS NOTE    Name: Lesly Lara MRN: [id number]   : 1980 [facility]   Date: 2022 Admission Date: 2022  8:01 PM     Hospital Day: 9  7 Days Post-Op  Subjective:  Patient without acute overnight events. Objective:  Vitals:    22 2026 22 0051 22 0446 22 0838   BP: 129/75 127/76 125/75 111/76   Pulse: 67 68 62 77   Resp: 16 16 16 16   Temp: 98.6 °F (37 °C) 97.8 °F (36.6 °C) 98 °F (36.7 °C) 97.8 °F (36.6 °C)   SpO2: 97% 96% 97% 97%   Weight:       Height:         Date 22 0700 - 22 0659 22 0700 - 22 0659   Shift 0075-2928 0453-0429 24 Hour Total 0395-8271 5090-0026 24 Hour Total   INTAKE   P.O. 600 360 960        P. O. 600 360 960      I. V.(mL/kg/hr)  8505(8.7) 6504(3.3)        Volume (dextrose 5% - 0.9% NaCl with KCl 20 mEq/L infusion)  1905 1905        Volume (levoFLOXacin (LEVAQUIN) 500 mg in D5W IVPB)  100 100        Volume (metroNIDAZOLE (FLAGYL) IVPB premix 500 mg)  100 100      Shift Total(mL/kg) 600(7.4) 1653(09.0) 2615(75.3)      OUTPUT   Urine(mL/kg/hr)    450  450     Urine Voided    450  450   Shift Total(mL/kg)    450(5.5)  450(5.5)    2465 3065 -450  -450   Weight (kg) 81.3 81.3 81.3 81.3 81.3 81.3         Physical Exam:    General: Awake and alert, oriented x4, no apparent distress   Abdomen: abdomen is soft without tenderness. Incision(s) are C/D/I.   No masses, organomegaly or guarding   Extremities: No c/c/e BLE  Labs:  Recent Results (from the past 24 hour(s))   CBC WITH AUTOMATED DIFF    Collection Time: 22  3:21 AM   Result Value Ref Range    WBC 9.5 4.6 - 13.2 K/uL    RBC 4.07 (L) 4.35 - 5.65 M/uL    HGB 11.0 (L) 13.0 - 16.0 g/dL    HCT 33.1 (L) 36.0 - 48.0 %    MCV 81.3 78.0 - 100.0 FL    MCH 27.0 24.0 - 34.0 PG    MCHC 33.2 31.0 - 37.0 g/dL    RDW 13.8 11.6 - 14.5 %    PLATELET 142 090 - 492 K/uL    MPV 8.9 (L) 9.2 - 11.8 FL    NRBC 0.0 0  WBC    ABSOLUTE NRBC 0.00 0.00 - 0.01 K/uL    NEUTROPHILS 77 (H) 40 - 73 %    LYMPHOCYTES 9 (L) 21 - 52 %    MONOCYTES 8 3 - 10 %    EOSINOPHILS 2 0 - 5 %    BASOPHILS 0 0 - 2 %    IMMATURE GRANULOCYTES 4 (H) 0.0 - 0.5 %    ABS. NEUTROPHILS 7.4 1.8 - 8.0 K/UL    ABS. LYMPHOCYTES 0.9 0.9 - 3.6 K/UL    ABS. MONOCYTES 0.7 0.05 - 1.2 K/UL    ABS. EOSINOPHILS 0.2 0.0 - 0.4 K/UL    ABS. BASOPHILS 0.0 0.0 - 0.1 K/UL    ABS. IMM.  GRANS. 0.4 (H) 0.00 - 0.04 K/UL    DF AUTOMATED       All Micro Results       Procedure Component Value Units Date/Time    CULTURE, BLOOD [[id number]] Collected: 06/24/22 2245    Order Status: Completed Specimen: Blood Updated: 06/30/22 0636     Special Requests: NO SPECIAL REQUESTS        Culture result: NO GROWTH 6 DAYS       CULTURE, BLOOD [[id number]] Collected: 06/24/22 2230    Order Status: Completed Specimen: Blood Updated: 06/30/22 0636     Special Requests: NO SPECIAL REQUESTS        Culture result: NO GROWTH 6 DAYS       CULTURE, BODY FLUID Vee Luis STAIN [[id number]]  (Abnormal)  (Susceptibility) Collected: 06/25/22 0834    Order Status: Completed Specimen: Fluid Updated: 06/28/22 1026     Special Requests: NO SPECIAL REQUESTS        GRAM STAIN RARE WBCS SEEN         NO ORGANISMS SEEN        Culture result: HEAVY KLEBSIELLA OXYTOCA         FEW ALPHA STREPTOCOCCUS       CULTURE, ANAEROBIC [[id number]] Collected: 06/25/22 0824    Order Status: Completed Specimen: Surgical Specimen Updated: 06/27/22 1223     Special Requests: NO SPECIAL REQUESTS        Culture result: NO ANAEROBES ISOLATED       CULTURE, TISSUE W Pradeepbailee Kong [[id number]] Collected: 06/25/22 0830    Order Status: Canceled Specimen: Appendix     COVID-19 RAPID TEST [[id number]] Collected: 06/24/22 2250    Order Status: Completed Specimen: Nasopharyngeal Updated: 06/24/22 2327     Specimen source Nasopharyngeal        COVID-19 rapid test Not detected        Comment: Rapid Abbott ID Now       Rapid NAAT:  The specimen is NEGATIVE for SARS-CoV-2, the novel coronavirus associated with COVID-19. Negative results should be treated as presumptive and, if inconsistent with clinical signs and symptoms or necessary for patient management, should be tested with an alternative molecular assay. Negative results do not preclude SARS-CoV-2 infection and should not be used as the sole basis for patient management decisions. This test has been authorized by the FDA under an Emergency Use Authorization (EUA) for use by authorized laboratories.    Fact sheet for Healthcare Providers: ConventionUpdate.co.nz  Fact sheet for Patients: ConventionQBotixdate.co.nz       Methodology: Isothermal Nucleic Acid Amplification                 Current Medications:  Current Facility-Administered Medications   Medication Dose Route Frequency Provider Last Rate Last Admin    metroNIDAZOLE (FLAGYL) IVPB premix 500 mg  500 mg IntraVENous Q12H Becca CASAS  mL/hr at 07/02/22 0520 500 mg at 07/02/22 0520    dextrose 5% - 0.9% NaCl with KCl 20 mEq/L infusion  75 mL/hr IntraVENous CONTINUOUS Traci Sotomayor MD 75 mL/hr at 07/01/22 2218 75 mL/hr at 07/01/22 2218    docusate sodium (COLACE) capsule 100 mg  100 mg Oral BID Traci Sotomayor MD   100 mg at 07/02/22 0818    bisacodyL (DULCOLAX) suppository 10 mg  10 mg Rectal DAILY PRN Traci Sotomayor MD        HYDROmorphone (DILAUDID) tablet 4 mg  4 mg Oral Q6H PRN Traci Sotomayor MD   4 mg at 07/01/22 2218    ketorolac (TORADOL) injection 15 mg  15 mg IntraVENous Q8H Traci Sotomayor MD   15 mg at 07/02/22 0520    levoFLOXacin (LEVAQUIN) 500 mg in D5W IVPB  500 mg IntraVENous Q24H Becca CASAS  mL/hr at 07/01/22 2215 500 mg at 07/01/22 2215    sodium chloride (NS) flush 5-40 mL  5-40 mL IntraVENous Q8H Traci Sotomayor MD   10 mL at 07/02/22 0503    sodium chloride (NS) flush 5-40 mL  5-40 mL IntraVENous PRN Traci Sotomayor MD        acetaminophen (TYLENOL) tablet 650 mg  650 mg Oral Q6H Chelsie Pitts MD   650 mg at 07/02/22 0818    naloxone [facility]) injection 0.4 mg  0.4 mg IntraVENous PRN Chelsie Pitts MD        ondansetron [facility]) injection 4 mg  4 mg IntraVENous Q4H PRN Chelsie Pitts MD   4 mg at 06/27/22 2058    diphenhydrAMINE (BENADRYL) injection 12.5 mg  12.5 mg IntraVENous Q4H PRN Chelsie Pitts MD        enoxaparin (LOVENOX) injection 40 mg  40 mg SubCUTAneous Q24H Chelsie Pitts MD   40 mg at 07/01/22 1637       Chart and notes reviewed. Data reviewed. I have evaluated and examined the patient. IMPRESSION:   Patient doing well postop day 7 from laparoscopic appendectomy for perforated acute appendicitis with abscess.       PLAN:/DISCUSION:   Discharge home on p.o. antibiotics per infectious disease  Follow-up with Dr. Yfn Vega in 3 weeks        Maryruth Dakin, MD

## 2022-07-02 NOTE — PROGRESS NOTES
Bedside and Verbal shift change report given to Mehran Murphy RN (oncoming nurse) by Kimberly Daniel (offgoing nurse). Report included the following information SBAR, Kardex, Intake/Output, MAR, Recent Results and Med Rec Status.

## 2022-07-02 NOTE — PROGRESS NOTES
Problem: Pain  Goal: *Control of Pain  Outcome: Progressing Towards Goal     Problem: Patient Education: Go to Patient Education Activity  Goal: Patient/Family Education  Outcome: Progressing Towards Goal     Problem: Falls - Risk of  Goal: *Absence of Falls  Description: Document Dunia Kalyn Fall Risk and appropriate interventions in the flowsheet.   Outcome: Progressing Towards Goal  Note: Fall Risk Interventions:  Mobility Interventions: Assess mobility with egress test,Patient to call before getting OOB         Medication Interventions: Patient to call before getting OOB    Elimination Interventions: Call light in reach              Problem: Patient Education: Go to Patient Education Activity  Goal: Patient/Family Education  Outcome: Progressing Towards Goal     Problem: Surgical Pathway Day of Surgery  Goal: Consults, if ordered  Outcome: Progressing Towards Goal  Goal: Nutrition/Diet  Outcome: Progressing Towards Goal  Goal: Medications  Outcome: Progressing Towards Goal  Goal: Respiratory  Outcome: Progressing Towards Goal  Goal: Treatments/Interventions/Procedures  Outcome: Progressing Towards Goal  Goal: Psychosocial  Outcome: Progressing Towards Goal  Goal: *No signs and symptoms of infection or wound complications  Outcome: Progressing Towards Goal

## 2022-07-02 NOTE — DISCHARGE INSTRUCTIONS
[facility]  Omero Valenzuela MD, FACS  General Surgery    Pt may remove the dressing and shower in two days. Please use your wash clothe to wash the incision with soap and water once the dressings have been removed  No driving or operating heavy machinery while on narcotic pain medications. Please apply an ice pack to the operative site for 30 minutes 3 times daily to help reduce pain and swelling and the need for narcotic pain medication. No strenuous activity or contact sports for two weeks. No lifting greater than 15 lbs for 2 weeks. Call MD for any redness, swelling, bleeding or pus at the incision. Also call for any nausea, vomiting, increased pain or pain uncontrolled by pain medicine. Patient Education        Appendectomy: What to Expect at Home  Your Recovery     Your doctor removed your appendix either by making many small cuts, called incisions, in your belly (laparoscopic surgery) or through open surgery. In open surgery, the doctor makes one large incision. The incisions leave scars that usually fade over time. After your surgery, it is normal to feel weak and tired for several days after you return home. Your belly may be swollen and may be painful. If you had laparoscopic surgery, you may have pain in your shoulder for about 24 hours. You may also feel sick to your stomach and have diarrhea, constipation, gas, or a headache. This usually goes away in a few days. Your recovery time depends on the type of surgery you had. If you had laparoscopic surgery, you will probably be able to return to work or a normal routine 1 to 3 weeks after surgery. If you had an open surgery, it may take 2 to 4 weeks. If your appendix ruptured, you may have a drain in your incision. Your body will work fine without an appendix. You won't have to make any changes in your diet or lifestyle. This care sheet gives you a general idea about how long it will take for you to recover.  But each person recovers at a different pace. Follow the steps below to get better as quickly as possible. How can you care for yourself at home? Activity    · Rest when you feel tired. Getting enough sleep will help you recover.     · Try to walk each day. Start by walking a little more than you did the day before. Bit by bit, increase the amount you walk. Walking boosts blood flow and helps prevent pneumonia and constipation.     · For about 2 weeks, avoid lifting anything that would make you strain. This may include a child, heavy grocery bags and milk containers, a heavy briefcase or backpack, cat litter or dog food bags, or a vacuum .     · Avoid strenuous activities, such as bicycle riding, jogging, weight lifting, or aerobic exercise, until your doctor says it is okay.     · You may be able to take showers (unless you have a drain near your incision) 24 to 48 hours after surgery. Pat the incision dry. Do not take a bath for the first 2 weeks, or until your doctor tells you it is okay. If you have a drain near your incision, follow your doctor's instructions.     · You may drive when you are no longer taking pain medicine and can quickly move your foot from the gas pedal to the brake. You must also be able to sit comfortably for a long period of time, even if you do not plan on going far. You might get caught in traffic.     · You will probably be able to go back to work in 1 to 3 weeks. If you had an open surgery, it may take 3 to 4 weeks.     · Your doctor will tell you when you can have sex again. Diet    · You can eat your normal diet. If your stomach is upset, try bland, low-fat foods like plain rice, broiled chicken, toast, and yogurt.     · Drink plenty of fluids (unless your doctor tells you not to).     · You may notice that your bowel movements are not regular right after your surgery. This is common. Try to avoid constipation and straining with bowel movements.  You may want to take a fiber supplement every day. If you have not had a bowel movement after a couple of days, ask your doctor about taking a mild laxative. Medicines    · Your doctor will tell you if and when you can restart your medicines. He or she will also give you instructions about taking any new medicines.     · If you take aspirin or some other blood thinner, ask your doctor if and when to start taking it again. Make sure that you understand exactly what your doctor wants you to do.     · If your appendix ruptured, you will need to take antibiotics. Take them as directed. Do not stop taking them just because you feel better. You need to take the full course of antibiotics.     · Be safe with medicines. Take pain medicines exactly as directed. ? If the doctor gave you a prescription medicine for pain, take it as prescribed. ? If you are not taking a prescription pain medicine, take an over-the-counter medicine such as acetaminophen (Tylenol), ibuprofen (Advil, Motrin), or naproxen (Aleve). Read and follow all instructions on the label. ? Do not take two or more pain medicines at the same time unless the doctor told you to. Many pain medicines have acetaminophen, which is Tylenol. Too much Tylenol can be harmful.     · If you think your pain medicine is making you sick to your stomach:  ? Take your medicine after meals (unless your doctor has told you not to). ? Ask your doctor for a different pain medicine. Incision care    · If you had an open surgery, you may have staples in your incision. The doctor will take these out in 7 to 10 days.     · If you have strips of tape on the incision, leave the tape on for a week or until it falls off.     · You may wash the area with warm, soapy water 24 to 48 hours after your surgery, unless your doctor tells you not to. Pat the area dry.     · Keep the area clean and dry. You may cover it with a gauze bandage if it weeps or rubs against clothing.  Change the bandage every day.     · If your appendix ruptured, you may have an incision with packing in it. Change the packing as often as your doctor tells you to. ? Packing changes may hurt at first. Taking pain medicine about half an hour before you change the dressing can help. ? If your dressing sticks to your wound, try soaking it with warm water for about 10 minutes before you remove it. You can do this in the shower or by placing a wet washcloth over the dressing. ? Remove the old packing and flush the incision with water. Gently pat the top area dry. ? The size of the incision determines how much gauze you need to put inside. Fold the gauze over once, but do not wad it up so that it hurts. Put it in the wound carefully. You want to keep the sides of the wound from touching. A cotton swab may help you push the gauze in as needed. ? Put a gauze pad over the wound, and tape it down. ? You may notice greenish gray fluid seeping from your wound as you start to heal. This is normal. It is a sign that your wound is healing. Follow-up care is a key part of your treatment and safety. Be sure to make and go to all appointments, and call your doctor if you are having problems. It's also a good idea to know your test results and keep a list of the medicines you take. When should you call for help? Call 911 anytime you think you may need emergency care. For example, call if:    · You passed out (lost consciousness).     · You are short of breath. .   Call your doctor now or seek immediate medical care if:    · You are sick to your stomach and cannot drink fluids.     · You cannot pass stools or gas.     · You have pain that does not get better when you take your pain medicine.     · You have signs of infection, such as:  ? Increased pain, swelling, warmth, or redness. ? Red streaks leading from the wound. ? Pus draining from the wound.   ? A fever.     · You have loose stitches, or your incision comes open.     · Bright red blood has soaked through the bandage over your incision.     · You have signs of a blood clot in your leg (called a deep vein thrombosis), such as:  ? Pain in your calf, back of knee, thigh, or groin. ? Redness and swelling in your leg or groin. Watch closely for any changes in your health, and be sure to contact your doctor if you have any problems. Where can you learn more? Go to http://www.gray.com/  Enter X801 in the search box to learn more about \"Appendectomy: What to Expect at Home. \"  Current as of: September 8, 2021               Content Version: 13.2  © 1039-9722 [employer]. Care instructions adapted under license by [employer] (which disclaims liability or warranty for this information). If you have questions about a medical condition or this instruction, always ask your healthcare professional. [address] any warranty or liability for your use of this information.

## 2022-07-02 NOTE — PROGRESS NOTES
Problem: Pain  Goal: *Control of Pain  Outcome: Progressing Towards Goal     Problem: Patient Education: Go to Patient Education Activity  Goal: Patient/Family Education  Outcome: Progressing Towards Goal     Problem: Falls - Risk of  Goal: *Absence of Falls  Description: Document Conrad Bella Fall Risk and appropriate interventions in the flowsheet.   Outcome: Progressing Towards Goal  Note: Fall Risk Interventions:  Mobility Interventions: Patient to call before getting OOB         Medication Interventions: Patient to call before getting OOB    Elimination Interventions: Bed/chair exit alarm,Call light in reach              Problem: Patient Education: Go to Patient Education Activity  Goal: Patient/Family Education  Outcome: Progressing Towards Goal     Problem: Surgical Pathway Day of Surgery  Goal: Consults, if ordered  Outcome: Progressing Towards Goal

## 2022-07-02 NOTE — DISCHARGE SUMMARY
[place] [address] Specialists  Romaine Lopez MD, [facility]  General Surgery  Discharge Summary     Patient ID:  Romaine Lopez  [id number]  39 y.o.  1980    Admit Date: 6/24/2022    Discharge Date: 7/2/2022    Admission Diagnoses: Ruptured appendicitis [K35.32]; Acute perforated appendicitis [K35.32]    Discharge Diagnoses:    Problem List as of 7/2/2022 Never Reviewed          Codes Class Noted - Resolved    Ruptured appendicitis ICD-10-CM: K35.32  ICD-9-CM: 540.0  6/24/2022 - Present        Acute perforated appendicitis ICD-10-CM: K35.32  ICD-9-CM: 540.0  6/24/2022 - Present               Admission Condition: Poor    Discharge Condition: Good    Last Procedure: Procedure(s):  [place] Course:   Hospital course was complicated by ileus and sepsis which added 6 days to the patient's length of stay. Consults: Infectious Disease    Significant Diagnostic Studies: microbiology: abscess culture     Disposition: home    Patient Instructions:   Current Discharge Medication List      START taking these medications    Details   HYDROmorphone (DILAUDID) 2 mg tablet Take 1 Tablet by mouth every four (4) hours as needed for Pain for up to 3 days. Max Daily Amount: 12 mg.  Qty: 20 Tablet, Refills: 0    Associated Diagnoses: Postoperative pain      acetaminophen (TYLENOL) 325 mg tablet Take 2 Tablets by mouth every six (6) hours. Indications: pain  Qty: 56 Tablet, Refills: 1      ibuprofen (MOTRIN) 600 mg tablet Take 1 Tablet by mouth every six (6) hours. Qty: 28 Tablet, Refills: 0      docusate sodium (COLACE) 100 mg capsule Take 1 Capsule by mouth two (2) times a day for 90 days. Qty: 60 Capsule, Refills: 2      bisacodyL 5 mg tab Take 5 mg by mouth daily as needed for PRN Reason (Other) (Constipation).   Qty: 3 Tablet, Refills: 0         CONTINUE these medications which have NOT CHANGED    Details   ondansetron hcl (Zofran) 4 mg tablet Take 1 Tablet by mouth every eight (8) hours as needed for Nausea. Qty: 14 Tablet, Refills: 0         STOP taking these medications       amoxicillin-clavulanate (Augmentin) 875-125 mg per tablet Comments:   Reason for Stopping:         oxyCODONE IR (Roxicodone) 5 mg immediate release tablet Comments:   Reason for Stopping:         dicyclomine (BENTYL) 10 mg capsule Comments:   Reason for Stopping:             Activity: See surgical instructions  Diet: Low fat, Low cholesterol  Wound Care: As directed    Follow-up with Dr. Pato Osborne in 4 weeks.   Follow-up tests/labs None    Signed:  Matthew Hill MD  7/2/2022  10:10 AM

## 2022-07-03 RX ORDER — LEVOFLOXACIN 500 MG/1
500 TABLET, FILM COATED ORAL DAILY
Qty: 7 TABLET | Refills: 0 | Status: SHIPPED | OUTPATIENT
Start: 2022-07-03

## 2022-07-05 ENCOUNTER — HOSPITAL ENCOUNTER (EMERGENCY)
Age: 42
Discharge: HOME | End: 2022-07-06
Attending: STUDENT IN AN ORGANIZED HEALTH CARE EDUCATION/TRAINING PROGRAM
Payer: COMMERCIAL

## 2022-07-05 VITALS
BODY MASS INDEX: 22.35 KG/M2 | RESPIRATION RATE: 18 BRPM | OXYGEN SATURATION: 98 % | TEMPERATURE: 99.5 F | HEIGHT: 72 IN | WEIGHT: 165 LBS | SYSTOLIC BLOOD PRESSURE: 134 MMHG | HEART RATE: 98 BPM | DIASTOLIC BLOOD PRESSURE: 75 MMHG

## 2022-07-05 DIAGNOSIS — Z09 STATUS POST APPENDECTOMY, FOLLOW-UP EXAM: ICD-10-CM

## 2022-07-05 DIAGNOSIS — L02.91 PHLEGMONOUS CELLULITIS: Primary | ICD-10-CM

## 2022-07-05 LAB
ALBUMIN SERPL-MCNC: 2.7 G/DL (ref 3.4–5)
ALBUMIN/GLOB SERPL: 0.5 {RATIO} (ref 0.8–1.7)
ALP SERPL-CCNC: 107 U/L (ref 45–117)
ALT SERPL-CCNC: 38 U/L (ref 16–61)
ANION GAP SERPL CALC-SCNC: 8 MMOL/L (ref 3–18)
AST SERPL-CCNC: 25 U/L (ref 10–38)
BASOPHILS # BLD: 0 K/UL (ref 0–0.1)
BASOPHILS NFR BLD: 0 % (ref 0–2)
BILIRUB SERPL-MCNC: 0.3 MG/DL (ref 0.2–1)
BUN SERPL-MCNC: 15 MG/DL (ref 7–18)
BUN/CREAT SERPL: 14 (ref 12–20)
CALCIUM SERPL-MCNC: 8.7 MG/DL (ref 8.5–10.1)
CHLORIDE SERPL-SCNC: 100 MMOL/L (ref 100–111)
CO2 SERPL-SCNC: 27 MMOL/L (ref 21–32)
CREAT SERPL-MCNC: 1.1 MG/DL (ref 0.6–1.3)
DIFFERENTIAL METHOD BLD: ABNORMAL
EOSINOPHIL # BLD: 0.1 K/UL (ref 0–0.4)
EOSINOPHIL NFR BLD: 1 % (ref 0–5)
ERYTHROCYTE [DISTWIDTH] IN BLOOD BY AUTOMATED COUNT: 14 % (ref 11.6–14.5)
GLOBULIN SER CALC-MCNC: 5.7 G/DL (ref 2–4)
GLUCOSE SERPL-MCNC: 117 MG/DL (ref 74–99)
HCT VFR BLD AUTO: 37.1 % (ref 36–48)
HGB BLD-MCNC: 11.9 G/DL (ref 13–16)
IMM GRANULOCYTES # BLD AUTO: 0.1 K/UL (ref 0–0.04)
IMM GRANULOCYTES NFR BLD AUTO: 1 % (ref 0–0.5)
LYMPHOCYTES # BLD: 1.1 K/UL (ref 0.9–3.6)
LYMPHOCYTES NFR BLD: 11 % (ref 21–52)
MCH RBC QN AUTO: 26.8 PG (ref 24–34)
MCHC RBC AUTO-ENTMCNC: 32.1 G/DL (ref 31–37)
MCV RBC AUTO: 83.6 FL (ref 78–100)
MONOCYTES # BLD: 0.9 K/UL (ref 0.05–1.2)
MONOCYTES NFR BLD: 8 % (ref 3–10)
NEUTS SEG # BLD: 8.1 K/UL (ref 1.8–8)
NEUTS SEG NFR BLD: 78 % (ref 40–73)
NRBC # BLD: 0 K/UL (ref 0–0.01)
NRBC BLD-RTO: 0 PER 100 WBC
PLATELET # BLD AUTO: 535 K/UL (ref 135–420)
PMV BLD AUTO: 8.6 FL (ref 9.2–11.8)
POTASSIUM SERPL-SCNC: 3.7 MMOL/L (ref 3.5–5.5)
PROT SERPL-MCNC: 8.4 G/DL (ref 6.4–8.2)
RBC # BLD AUTO: 4.44 M/UL (ref 4.35–5.65)
SODIUM SERPL-SCNC: 135 MMOL/L (ref 136–145)
WBC # BLD AUTO: 10.3 K/UL (ref 4.6–13.2)

## 2022-07-05 PROCEDURE — 87040 BLOOD CULTURE FOR BACTERIA: CPT

## 2022-07-05 PROCEDURE — 80053 COMPREHEN METABOLIC PANEL: CPT

## 2022-07-05 PROCEDURE — 85025 COMPLETE CBC W/AUTO DIFF WBC: CPT

## 2022-07-05 PROCEDURE — 93005 ELECTROCARDIOGRAM TRACING: CPT

## 2022-07-05 PROCEDURE — 96374 THER/PROPH/DIAG INJ IV PUSH: CPT

## 2022-07-05 PROCEDURE — 99285 EMERGENCY DEPT VISIT HI MDM: CPT

## 2022-07-05 PROCEDURE — 75810000289 HC I&D ABSCESS SIMP/COMP/MULT

## 2022-07-05 RX ORDER — CEFEPIME HYDROCHLORIDE 2 G/1
2 INJECTION, POWDER, FOR SOLUTION INTRAVENOUS EVERY 12 HOURS
Status: DISCONTINUED | OUTPATIENT
Start: 2022-07-05 | End: 2022-07-05 | Stop reason: RX

## 2022-07-06 ENCOUNTER — APPOINTMENT (OUTPATIENT)
Dept: CT IMAGING | Age: 42
End: 2022-07-06
Attending: STUDENT IN AN ORGANIZED HEALTH CARE EDUCATION/TRAINING PROGRAM
Payer: COMMERCIAL

## 2022-07-06 LAB — LACTATE BLD-SCNC: 1.06 MMOL/L (ref 0.4–2)

## 2022-07-06 PROCEDURE — 75810000289 HC I&D ABSCESS SIMP/COMP/MULT

## 2022-07-06 PROCEDURE — 74011000250 HC RX REV CODE- 250: Performed by: STUDENT IN AN ORGANIZED HEALTH CARE EDUCATION/TRAINING PROGRAM

## 2022-07-06 PROCEDURE — 74177 CT ABD & PELVIS W/CONTRAST: CPT

## 2022-07-06 PROCEDURE — 83605 ASSAY OF LACTIC ACID: CPT

## 2022-07-06 PROCEDURE — 96374 THER/PROPH/DIAG INJ IV PUSH: CPT

## 2022-07-06 PROCEDURE — 74011250636 HC RX REV CODE- 250/636: Performed by: STUDENT IN AN ORGANIZED HEALTH CARE EDUCATION/TRAINING PROGRAM

## 2022-07-06 PROCEDURE — 74011000636 HC RX REV CODE- 636: Performed by: STUDENT IN AN ORGANIZED HEALTH CARE EDUCATION/TRAINING PROGRAM

## 2022-07-06 RX ORDER — LIDOCAINE HYDROCHLORIDE 10 MG/ML
10 INJECTION, SOLUTION EPIDURAL; INFILTRATION; INTRACAUDAL; PERINEURAL ONCE
Status: COMPLETED | OUTPATIENT
Start: 2022-07-06 | End: 2022-07-06

## 2022-07-06 RX ORDER — SULFAMETHOXAZOLE AND TRIMETHOPRIM 800; 160 MG/1; MG/1
1 TABLET ORAL 2 TIMES DAILY
Qty: 20 TABLET | Refills: 0 | Status: SHIPPED | OUTPATIENT
Start: 2022-07-06 | End: 2022-07-16

## 2022-07-06 RX ADMIN — LIDOCAINE HYDROCHLORIDE 10 ML: 10 INJECTION, SOLUTION EPIDURAL; INFILTRATION; INTRACAUDAL; PERINEURAL at 02:25

## 2022-07-06 RX ADMIN — CEFEPIME HYDROCHLORIDE 2 G: 2 INJECTION, POWDER, FOR SOLUTION INTRAVENOUS at 01:28

## 2022-07-06 RX ADMIN — IOPAMIDOL 75 ML: 612 INJECTION, SOLUTION INTRAVENOUS at 00:34

## 2022-07-06 NOTE — ED TRIAGE NOTES
Pt reports abd surgery on the 25th due to an infection and later a burst appendix. Pt was here for 1 week.  Pt has lower abd wound that is leaking with pruelant fluid,

## 2022-07-06 NOTE — REMOTE MONITORING
Attempted to contact primary RN in regards to the sepsis bundle.     Katia Fernández MSN, RN, [address] [place] [address] RN   Call back # [phone]

## 2022-07-06 NOTE — ED PROVIDER NOTES
EMERGENCY DEPARTMENT HISTORY AND PHYSICAL EXAM    10:44 PM    Date: 7/5/2022  Patient Name: Umu Hendricks    History of Presenting Illness     Chief Complaint   Patient presents with    Wound Check    Abdominal Pain       History Provided By: Patient  Location/Duration/Severity/Modifying factors   HPI   Umu Hendricks is a 46 y.o. male with a past medical history of ruptured appendicitis on 6/24/2022 status post laparoscopic appendectomy and washout, discharged on 7/2 presenting for evaluation of drainage from his surgical incision. He says that for the last week or so he has been having a gradually worsening pain in the left lower quadrant of the abdomen around the surgical incision site. Tonight the pain seems to get worse, and then got better all of a sudden, looked down and noticed that the surgical incision was draining a whitish purulent material.  The pain got better after the wound started to drain. He says that he is compliant with his Augmentin antibiotics. He did not yet call Dr. Rohit Silva, his surgeon. Denies any fevers or chills, nausea, vomiting, urinary or bowel complaints. No other alleviating or aggravating factors. PCP: Bibiana Siddiqi., NP    Current Facility-Administered Medications   Medication Dose Route Frequency Provider Last Rate Last Admin    lidocaine (PF) (XYLOCAINE) 10 mg/mL (1 %) injection 10 mL  10 mL IntraDERMal ONCE Desirae Best MD        cefepime (MAXIPIME) 2 g in 0.9% sodium chloride (MBP/ADV) 100 mL MBP  2 g IntraVENous Q12H Desirae Best MD         Current Outpatient Medications   Medication Sig Dispense Refill    trimethoprim-sulfamethoxazole (Bactrim DS) 160-800 mg per tablet Take 1 Tablet by mouth two (2) times a day for 10 days. 20 Tablet 0    levoFLOXacin (LEVAQUIN) 500 mg tablet Take 1 Tablet by mouth daily. 7 Tablet 0    acetaminophen (TYLENOL) 325 mg tablet Take 2 Tablets by mouth every six (6) hours.  Indications: pain 56 Tablet 1    ibuprofen (MOTRIN) 600 mg tablet Take 1 Tablet by mouth every six (6) hours. 28 Tablet 0    docusate sodium (COLACE) 100 mg capsule Take 1 Capsule by mouth two (2) times a day for 90 days. 60 Capsule 2    bisacodyL 5 mg tab Take 5 mg by mouth daily as needed for PRN Reason (Other) (Constipation). 3 Tablet 0    ondansetron hcl (Zofran) 4 mg tablet Take 1 Tablet by mouth every eight (8) hours as needed for Nausea. 14 Tablet 0       Past History     Past Medical History:  Past Medical History:   Diagnosis Date    S/P appy 06/25/2022    acute ruptured       Past Surgical History:  History reviewed. No pertinent surgical history. Family History:  History reviewed. No pertinent family history. Social History:  Social History     Tobacco Use    Smoking status: Not on file    Smokeless tobacco: Not on file   Substance Use Topics    Alcohol use: Not on file    Drug use: Not on file       Allergies:  No Known Allergies    I reviewed and confirmed the above information with patient and updated as necessary. Review of Systems     Review of Systems   Constitutional: Negative for diaphoresis and fever. HENT: Negative for ear pain and sore throat. Eyes:        No acute change in vision   Respiratory: Negative for cough and shortness of breath. Cardiovascular: Negative for chest pain and leg swelling. Gastrointestinal: Positive for abdominal pain. Negative for diarrhea, nausea and vomiting. Genitourinary: Negative for dysuria. Musculoskeletal: Negative for neck pain. Skin: Positive for wound. Neurological: Negative for weakness and headaches. Physical Exam     Visit Vitals  /75 (BP 1 Location: Left upper arm)   Pulse 98   Temp 99.5 °F (37.5 °C)   Resp 18   Ht 6' (1.829 m)   Wt 74.8 kg (165 lb)   SpO2 98%   BMI 22.38 kg/m²       Physical Exam  Vitals and nursing note reviewed. Constitutional:       Appearance: Normal appearance. He is not ill-appearing.       Comments: Adult male resting comfortably, nondistressed   HENT:      Mouth/Throat:      Mouth: Mucous membranes are moist.   Eyes:      Pupils: Pupils are equal, round, and reactive to light. Cardiovascular:      Rate and Rhythm: Normal rate and regular rhythm. Pulses: Normal pulses. Heart sounds: Normal heart sounds. Pulmonary:      Effort: Pulmonary effort is normal.      Breath sounds: Normal breath sounds. Abdominal:      General: Abdomen is flat. Tenderness: There is abdominal tenderness (Surgical incision and left lower quadrant with dressing tape in place, actively draining a purulent white material, malodorous. Mild surrounding induration). Musculoskeletal:         General: No swelling. Normal range of motion. Cervical back: Normal range of motion. Skin:     General: Skin is warm. Neurological:      General: No focal deficit present. Mental Status: He is alert and oriented to person, place, and time. Diagnostic Study Results     Labs -  Recent Results (from the past 24 hour(s))   CBC WITH AUTOMATED DIFF    Collection Time: 07/05/22 10:14 PM   Result Value Ref Range    WBC 10.3 4.6 - 13.2 K/uL    RBC 4.44 4.35 - 5.65 M/uL    HGB 11.9 (L) 13.0 - 16.0 g/dL    HCT 37.1 36.0 - 48.0 %    MCV 83.6 78.0 - 100.0 FL    MCH 26.8 24.0 - 34.0 PG    MCHC 32.1 31.0 - 37.0 g/dL    RDW 14.0 11.6 - 14.5 %    PLATELET 403 (H) 734 - 420 K/uL    MPV 8.6 (L) 9.2 - 11.8 FL    NRBC 0.0 0  WBC    ABSOLUTE NRBC 0.00 0.00 - 0.01 K/uL    NEUTROPHILS 78 (H) 40 - 73 %    LYMPHOCYTES 11 (L) 21 - 52 %    MONOCYTES 8 3 - 10 %    EOSINOPHILS 1 0 - 5 %    BASOPHILS 0 0 - 2 %    IMMATURE GRANULOCYTES 1 (H) 0.0 - 0.5 %    ABS. NEUTROPHILS 8.1 (H) 1.8 - 8.0 K/UL    ABS. LYMPHOCYTES 1.1 0.9 - 3.6 K/UL    ABS. MONOCYTES 0.9 0.05 - 1.2 K/UL    ABS. EOSINOPHILS 0.1 0.0 - 0.4 K/UL    ABS. BASOPHILS 0.0 0.0 - 0.1 K/UL    ABS. IMM.  GRANS. 0.1 (H) 0.00 - 0.04 K/UL    DF AUTOMATED     METABOLIC PANEL, COMPREHENSIVE    Collection Time: 07/05/22 10:14 PM   Result Value Ref Range    Sodium 135 (L) 136 - 145 mmol/L    Potassium 3.7 3.5 - 5.5 mmol/L    Chloride 100 100 - 111 mmol/L    CO2 27 21 - 32 mmol/L    Anion gap 8 3.0 - 18 mmol/L    Glucose 117 (H) 74 - 99 mg/dL    BUN 15 7.0 - 18 MG/DL    Creatinine 1.10 0.6 - 1.3 MG/DL    BUN/Creatinine ratio 14 12 - 20      GFR est AA >60 >60 ml/min/1.73m2    GFR est non-AA >60 >60 ml/min/1.73m2    Calcium 8.7 8.5 - 10.1 MG/DL    Bilirubin, total 0.3 0.2 - 1.0 MG/DL    ALT (SGPT) 38 16 - 61 U/L    AST (SGOT) 25 10 - 38 U/L    Alk. phosphatase 107 45 - 117 U/L    Protein, total 8.4 (H) 6.4 - 8.2 g/dL    Albumin 2.7 (L) 3.4 - 5.0 g/dL    Globulin 5.7 (H) 2.0 - 4.0 g/dL    A-G Ratio 0.5 (L) 0.8 - 1.7     POC LACTIC ACID    Collection Time: 07/06/22  1:45 AM   Result Value Ref Range    Lactic Acid (POC) 1.06 0.40 - 2.00 mmol/L         Radiologic Studies -   CT ABD PELV W CONT   Final Result      Approximately 3.8 x 2.5 cm phlegmon in the left lower quadrant anterior   abdominal wall at the presumed surgical incision site. No well-defined capsule   to suggest abscess. Ongoing mild peritonitis with slight loculated fluid in the peritoneal cavity   although improved from prior. No discrete drainable intraperitoneal abscess. Persistent dilated small bowel loops as before. Ongoing postoperative ileus is   possible although a developing partial small bowel obstruction is not excluded. Clinical correlation is advised. Small bilateral pleural effusions. Incidentals as above. Medical Decision Making   I am the first provider for this patient. I reviewed the vital signs, available nursing notes, past medical history, past surgical history, family history and social history. Vital Signs-Reviewed the patient's vital signs.     EKG: Nondiagnostic for ischemia or arrhythmia    Records Reviewed: Nursing Notes and Old Medical Records (Time of Review: 10:44 PM)    Provider Notes (Medical Decision Making):   MDM  72-year-old male here for a wound check consider postoperative infection, seroma, sepsis, others    ED Course: Progress Notes, Reevaluation, and Consults:  Patient afebrile and hemodynamically normal here, resting comfortably, nondistressed    Will obtain a CT of his abdomen pelvis as well as basic labs. Will treat with a one-time dose of cefepime while we await results. CBC shows no leukocytosis and his lactic acid is normal  CMP unremarkable  CT abdomen pelvis shows a phlegmon in the abdominal wall at the same location where he is having drainage. His abdomen is soft and nontender, no concern for peritonitis, and is having bowel movements, no nausea vomiting, and has been tolerating food, do not suspect a bowel obstruction. Discussed with Dr. Chung Lara the CT findings, he recommends that I perform a bedside opening of the surgical incision, drainage and packing. This was performed, please see separate note, copious drainage, approximately 25- 50 cc were removed, improvement of the patient's symptoms. Packing was left in place. At Dr. Karin Bolton recommendation we will add Bactrim to his Augmentin that he is currently on. We discussed at length signs and symptoms of prompt return to the emergency department. Also discussed on how to take care of of the area. He was discharged home in stable condition. I&D Abcess Complex    Date/Time: 7/6/2022 2:50 AM  Performed by: Christ Freeman MD  Authorized by: Christ Freeman MD     Consent:     Consent obtained:  Verbal    Consent given by:  Patient    Risks discussed:  Bleeding, incomplete drainage and infection    Alternatives discussed:  No treatment  Location:     Type:  Surgical wound infection    Size:  [id number]    Location:  Trunk    Trunk location:  Abdomen  Pre-procedure details:     Skin preparation:  Chloraprep and antiseptic wash  Anesthesia (see MAR for exact dosages):      Anesthesia method:  Local infiltration    Local anesthetic:  Lidocaine 1% w/o epi  Procedure type:     Complexity:  Complex  Procedure details:     Surgical wound: surgical wound reopened      Needle aspiration: no      Incision types:  Single straight    Scalpel blade:  11    Wound management:  Probed and deloculated, irrigated with saline and extensive cleaning    Drainage:  Purulent    Drainage amount:  Copious    Wound treatment:  Wound left open    Packing materials:  1/2 in gauze  Post-procedure details:     Patient tolerance of procedure: Tolerated well, no immediate complications      Diagnosis     Clinical Impression:   1. Phlegmonous cellulitis    2. Status post appendectomy, follow-up exam        Disposition: Home    Follow-up Information     Follow up With Specialties Details Why [address] [facility] EMERGENCY DEPT Emergency Medicine  As needed, If symptoms worsen [address] [place] [id number]  [phone]    Chelsie Pitts MD General Surgery Schedule an appointment as soon as possible for a visit   Jame Berme[address] [phone]             Patient's Medications   Start Taking    TRIMETHOPRIM-SULFAMETHOXAZOLE (BACTRIM DS) 160-800 MG PER TABLET    Take 1 Tablet by mouth two (2) times a day for 10 days. Continue Taking    ACETAMINOPHEN (TYLENOL) 325 MG TABLET    Take 2 Tablets by mouth every six (6) hours. Indications: pain    BISACODYL 5 MG TAB    Take 5 mg by mouth daily as needed for PRN Reason (Other) (Constipation). DOCUSATE SODIUM (COLACE) 100 MG CAPSULE    Take 1 Capsule by mouth two (2) times a day for 90 days. IBUPROFEN (MOTRIN) 600 MG TABLET    Take 1 Tablet by mouth every six (6) hours. LEVOFLOXACIN (LEVAQUIN) 500 MG TABLET    Take 1 Tablet by mouth daily. ONDANSETRON HCL (ZOFRAN) 4 MG TABLET    Take 1 Tablet by mouth every eight (8) hours as needed for Nausea.    These Medications have changed    No medications on file   Stop Taking    No medications on file       Sue Santiago MD   Emergency Medicine   July 6, 2022, 10:44 PM     This note is dictated utilizing Dragon voice recognition software. Unfortunately this leads to occasional typographical errors using the voice recognition. I apologize in advance if the situation occurs. If questions occur please do not hesitate to contact me directly.     Vee Paul MD

## 2022-07-06 NOTE — DISCHARGE INSTRUCTIONS
Please change the dressing at least once per day as I showed you. Leave the packing in place until you are able to see Dr. Armando Lara. Return to the emergency department if you develop worsening pain, fevers, redness, bleeding or other concerning symptoms.

## 2022-07-06 NOTE — PROGRESS NOTES
Reason for Renal Dosing:  Per Renal Dosing Policy/Extended Infusion B-Lactam Antibiotics Protocol. Patient clinical status and labs ordered/reviewed. Pt Weight Weight: 74.8 kg (165 lb)   Serum Creatinine Lab Results   Component Value Date/Time    Creatinine 1.10 07/05/2022 10:14 PM       Creatinine Clearance Estimated Creatinine Clearance: 95.4 mL/min (based on SCr of 1.1 mg/dL). BUN Lab Results   Component Value Date/Time    BUN 15 07/05/2022 10:14 PM       WBC Lab Results   Component Value Date/Time    WBC 10.3 07/05/2022 10:14 PM      Temperature Temp: 99.5 °F (37.5 °C)   HR Pulse (Heart Rate): 98     BP BP: 134/75             Drug type: Antibiotic indicated for Intra-Abdominal Infection      Drug/dose: Cefepime 2 g IV q12h was adjusted to: 2 g IV over 3 min x1, then 2 g IV over 4 hrs q12h (for eCrCl = 95.4 mL/min) - per Extended Infusion B-Lactam Antibiotics Protocol. Continue to monitor.     Signed TORI [facility]  Date 7/5/2022  Time 11:02 PM

## 2022-07-07 LAB
ATRIAL RATE: 91 BPM
CALCULATED P AXIS, ECG09: 52 DEGREES
CALCULATED R AXIS, ECG10: 44 DEGREES
CALCULATED T AXIS, ECG11: 43 DEGREES
DIAGNOSIS, 93000: NORMAL
P-R INTERVAL, ECG05: 154 MS
Q-T INTERVAL, ECG07: 350 MS
QRS DURATION, ECG06: 86 MS
QTC CALCULATION (BEZET), ECG08: 430 MS
VENTRICULAR RATE, ECG03: 91 BPM

## 2022-07-11 ENCOUNTER — OFFICE VISIT (OUTPATIENT)
Dept: SURGERY | Age: 42
End: 2022-07-11
Payer: COMMERCIAL

## 2022-07-11 VITALS
HEIGHT: 72 IN | HEART RATE: 96 BPM | DIASTOLIC BLOOD PRESSURE: 67 MMHG | OXYGEN SATURATION: 99 % | BODY MASS INDEX: 21.13 KG/M2 | SYSTOLIC BLOOD PRESSURE: 137 MMHG | WEIGHT: 156 LBS

## 2022-07-11 DIAGNOSIS — K35.32 RUPTURED APPENDICITIS: Primary | ICD-10-CM

## 2022-07-11 PROCEDURE — 99024 POSTOP FOLLOW-UP VISIT: CPT | Performed by: SURGERY

## 2022-07-11 NOTE — PROGRESS NOTES
Wicho Fenton is a 46 y.o. male (: 1980) presenting to address:    Chief Complaint   Patient presents with    Surgical Follow-up     Appendectomy 22 by Dr Sudarshan Cadena       Medication list and allergies have been reviewed with Brentjosi Fenton and updated as of today's date. I have gone over all Medical, Surgical and Social History with Brentjosi Fenton and updated/added the information accordingly. 1. Have you been to the ER, Urgent Care or Hospitalized since your last visit? YES. [facility]-ER 22 surgical wound infection. I and D of surgical wound. Prescribed Bactrim      2. Have you followed up with your PCP or any other Physicians since your procedure/ last office visit?    NO

## 2022-07-11 NOTE — PROGRESS NOTES
Patient seen and examined. He is status post laparoscopic appendectomy on June 25. He developed abdominal pain and drainage from his left lower quadrant abdominal wound for which she went to the emergency room. A CT scan was performed and showed an intra-abdominal phlegmon but no abscess but because of the drainage from the wound the emergency room open the wound and they drained it and impacted. The patient was discharged home and currently is doing well. He is here today with his mother. He stated that he still taking the 2 antibiotics that were prescribed to him 1 after the surgery and the other 1 was added after he went to the emergency room for his wound infection. According to them the wound with gauze has been changed to 3 times a day but not because it is soaked but just because the tape is hurting him. However it seems that there was a packing according to them that he did not change since they went to the emergency room. He denies any fever or abdominal pain and he is tolerating regular diet and having normal bowel movements. On examination his abdomen is soft and nontender and his 2 wounds are located in the infraumbilical and suprapubic area are clean. There is a gauze in the left lower quadrant that I removed and there was an iodoform packing that I removed that was soaked with what seems to be pus that has been there for a while. I cleaned the wound and it is about 1 cm long and half centimeter wide. There is no drainage and there is no pus and there is no fluctuation or induration. I explained to the patient and his mom that they will need to continue with the packing of the wound with wet-to-dry dressing until it heals from inside out. Also to continue the antibiotics till it is done and follow-up in couple weeks with Dr. Jason Varghese just to make sure that he continues to do well.

## 2022-08-02 ENCOUNTER — OFFICE VISIT (OUTPATIENT)
Dept: SURGERY | Age: 42
End: 2022-08-02
Payer: COMMERCIAL

## 2022-08-02 VITALS
BODY MASS INDEX: 21.67 KG/M2 | OXYGEN SATURATION: 100 % | SYSTOLIC BLOOD PRESSURE: 120 MMHG | WEIGHT: 160 LBS | HEART RATE: 90 BPM | DIASTOLIC BLOOD PRESSURE: 78 MMHG | TEMPERATURE: 98.1 F | RESPIRATION RATE: 18 BRPM | HEIGHT: 72 IN

## 2022-08-02 DIAGNOSIS — Z09 POSTOPERATIVE EXAMINATION: Primary | ICD-10-CM

## 2022-08-02 PROCEDURE — 99024 POSTOP FOLLOW-UP VISIT: CPT | Performed by: SURGERY

## 2022-08-02 NOTE — PROGRESS NOTES
Gail Kim is a 46 y.o. male  Chief Complaint   Patient presents with    Follow-up     6/25/2022 lap appy     Visit Vitals  /78   Pulse 90   Temp 98.1 °F (36.7 °C)   Resp 18   Ht 6' (1.829 m)   Wt 160 lb (72.6 kg)   SpO2 100%   BMI 21.70 kg/m²

## 2022-08-02 NOTE — PROGRESS NOTES
[facility]  General Surgery    Name: Michael Astudillo MRN: [id number]   : 1980 Hospital: [facility]   Date: 2022 Admission Date: No admission date for patient encounter. Subjective:  Patient presents today without complaints. He has completed his oral antibiotics. Bowels are moving well. No nausea vomiting with meals. He is scheduled to return to work without restrictions on .  Objective:  Vitals:    22 1507   BP: 120/78   Pulse: 90   Resp: 18   Temp: 98.1 °F (36.7 °C)   SpO2: 100%   Weight: 72.6 kg (160 lb)   Height: 6' (1.829 m)       Physical Exam:    General: Awake and alert, oriented x4, no apparent distress   Abdomen: abdomen is soft with minimal left lower quadrant incisional tenderness. Incision(s) are C/D/I. No masses, organomegaly or guarding    Current Medications:  Current Outpatient Medications   Medication Sig Dispense Refill    levoFLOXacin (LEVAQUIN) 500 mg tablet Take 1 Tablet by mouth daily. (Patient not taking: Reported on 2022) 7 Tablet 0    acetaminophen (TYLENOL) 325 mg tablet Take 2 Tablets by mouth every six (6) hours. Indications: pain (Patient not taking: No sig reported) 56 Tablet 1    ibuprofen (MOTRIN) 600 mg tablet Take 1 Tablet by mouth every six (6) hours. (Patient not taking: Reported on 2022) 28 Tablet 0    docusate sodium (COLACE) 100 mg capsule Take 1 Capsule by mouth two (2) times a day for 90 days. (Patient not taking: Reported on 2022) 60 Capsule 2    bisacodyL 5 mg tab Take 5 mg by mouth daily as needed for PRN Reason (Other) (Constipation). (Patient not taking: Reported on 2022) 3 Tablet 0    ondansetron hcl (Zofran) 4 mg tablet Take 1 Tablet by mouth every eight (8) hours as needed for Nausea. (Patient not taking: No sig reported) 14 Tablet 0       Chart and notes reviewed. Data reviewed. I have evaluated and examined the patient.         IMPRESSION:   Patient doing well following appendectomy laparoscopic with drainage of abscess for acute perforated appendicitis with abscess.       PLAN:/DISCUSION:   Patient may return to work without restrictions on August 13  Follow-up as needed        Michelle Wilson MD

## 2022-09-08 ENCOUNTER — OFFICE VISIT (OUTPATIENT)
Dept: SURGERY | Age: 42
End: 2022-09-08
Payer: COMMERCIAL

## 2022-09-08 VITALS
SYSTOLIC BLOOD PRESSURE: 114 MMHG | BODY MASS INDEX: 21.64 KG/M2 | HEIGHT: 72 IN | TEMPERATURE: 97.1 F | WEIGHT: 159.8 LBS | OXYGEN SATURATION: 100 % | DIASTOLIC BLOOD PRESSURE: 66 MMHG | HEART RATE: 59 BPM

## 2022-09-08 DIAGNOSIS — Z09 POSTOPERATIVE EXAMINATION: Primary | ICD-10-CM

## 2022-09-08 PROCEDURE — 99024 POSTOP FOLLOW-UP VISIT: CPT | Performed by: SURGERY

## 2022-09-08 NOTE — PROGRESS NOTES
Chata Mcdaniels is a 46 y.o. male (: 1980) presenting to address:    Chief Complaint   Patient presents with    Post OP Follow Up     Abd pain/ S/P Appendectomy 22 by Dr. Francesca Graves last seen 22       Medication list and allergies have been reviewed with Chata Mcdaniels and updated as of today's date. I have gone over all Medical, Surgical and Social History with Chata Mcdaniels and updated/added the information accordingly. 1. Have you been to the ER, Urgent Care or Hospitalized since your last visit? NO      2. Have you followed up with your PCP or any other Physicians since your procedure/ last office visit? YES.  Wed last week 22 for sharp abd pain

## 2022-09-08 NOTE — PROGRESS NOTES
Patient seen and examined. He is doing much better. He stated that the wound has healed completely. He said that last week he had some abdominal pain in the left lower quadrant but it has resolved completely now. He is able to tolerate regular diet and having normal bowel movements. On exam his abdomen is soft and nontender and his wounds are clean. The left lower quadrant wound has completely healed. Follow-up as needed.

## 2022-11-25 ENCOUNTER — HOSPITAL ENCOUNTER (EMERGENCY)
Age: 42
Discharge: HOME | End: 2022-11-25
Attending: EMERGENCY MEDICINE
Payer: COMMERCIAL

## 2022-11-25 VITALS
TEMPERATURE: 98.8 F | DIASTOLIC BLOOD PRESSURE: 72 MMHG | HEART RATE: 97 BPM | SYSTOLIC BLOOD PRESSURE: 127 MMHG | BODY MASS INDEX: 21.67 KG/M2 | OXYGEN SATURATION: 100 % | WEIGHT: 160 LBS | HEIGHT: 72 IN | RESPIRATION RATE: 16 BRPM

## 2022-11-25 DIAGNOSIS — J02.9 ACUTE PHARYNGITIS, UNSPECIFIED ETIOLOGY: Primary | ICD-10-CM

## 2022-11-25 LAB
DEPRECATED S PYO AG THROAT QL EIA: NEGATIVE
FLUAV RNA SPEC QL NAA+PROBE: NOT DETECTED
FLUBV RNA SPEC QL NAA+PROBE: NOT DETECTED
SARS-COV-2, COV2: NOT DETECTED

## 2022-11-25 PROCEDURE — 99283 EMERGENCY DEPT VISIT LOW MDM: CPT

## 2022-11-25 PROCEDURE — 87147 CULTURE TYPE IMMUNOLOGIC: CPT

## 2022-11-25 PROCEDURE — 87636 SARSCOV2 & INF A&B AMP PRB: CPT

## 2022-11-25 PROCEDURE — 87880 STREP A ASSAY W/OPTIC: CPT

## 2022-11-25 PROCEDURE — 87070 CULTURE OTHR SPECIMN AEROBIC: CPT

## 2022-11-25 NOTE — ED PROVIDER NOTES
82-year-old male no past medical history presents the emergency department with sore throat for the past 5 days. Patient states he has subjective fevers and chills. Patient is able to tolerate p.o. however it hurts when he swallows. Better with rest.  No treatment with over-the-counter medication. No history of similar episode. 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 Patient has no nausea no vomiting no diarrhea. No other issues expressed. Past Medical History:   Diagnosis Date    S/P appy 06/25/2022    acute ruptured       Past Surgical History:   Procedure Laterality Date    HX APPENDECTOMY Right 06/25/2022    Laparoscopic Appendectomy         No family history on file. Social History     Socioeconomic History    Marital status: SINGLE     Spouse name: Not on file    Number of children: Not on file    Years of education: Not on file    Highest education level: Not on file   Occupational History    Not on file   Tobacco Use    Smoking status: Never    Smokeless tobacco: Never   Substance and Sexual Activity    Alcohol use: Never    Drug use: Never    Sexual activity: Not on file   Other Topics Concern    Not on file   Social History Narrative    Not on file     Social Determinants of Health     Financial Resource Strain: Not on file   Food Insecurity: Not on file   Transportation Needs: Not on file   Physical Activity: Not on file   Stress: Not on file   Social Connections: Not on file   Intimate Partner Violence: Not on file   Housing Stability: Not on file         ALLERGIES: Patient has no known allergies. Review of Systems   Constitutional:  Positive for fever. Negative for chills. HENT:  Positive for sore throat. Respiratory: Negative. Cardiovascular: Negative. Gastrointestinal: Negative. Genitourinary: Negative. Neurological: Negative. Hematological: Negative. All other systems reviewed and are negative.     Vitals:    11/25/22 8449 BP: 127/72   Pulse: 97   Resp: 16   Temp: 98.8 °F (37.1 °C)   SpO2: 100%   Weight: 72.6 kg (160 lb)   Height: 6' (1.829 m)            Physical Exam  Vitals and nursing note reviewed. Constitutional:       General: He is not in acute distress. Appearance: He is well-developed. He is not ill-appearing, toxic-appearing or diaphoretic. HENT:      Head: Normocephalic and atraumatic. Nose: No congestion or rhinorrhea. Mouth/Throat:      Mouth: Mucous membranes are moist. No oral lesions. Pharynx: No pharyngeal swelling, oropharyngeal exudate, posterior oropharyngeal erythema or uvula swelling. Eyes:      General: No scleral icterus. Right eye: No discharge. Left eye: No discharge. Conjunctiva/sclera: Conjunctivae normal.      Pupils: Pupils are equal, round, and reactive to light. Neck:      Vascular: No JVD. Cardiovascular:      Rate and Rhythm: Normal rate and regular rhythm. Heart sounds: Normal heart sounds. No murmur heard. No friction rub. No gallop. Pulmonary:      Effort: Pulmonary effort is normal. No respiratory distress. Breath sounds: Normal breath sounds. No wheezing. Abdominal:      General: Bowel sounds are normal.      Palpations: Abdomen is soft. There is no mass. Tenderness: There is no abdominal tenderness. There is no left CVA tenderness or rebound. Hernia: No hernia is present. Musculoskeletal:         General: Normal range of motion. Cervical back: Normal range of motion and neck supple. Skin:     General: Skin is warm and dry. Capillary Refill: Capillary refill takes less than 2 seconds. Neurological:      Mental Status: He is alert and oriented to person, place, and time.         MDM  Number of Diagnoses or Management Options  Acute pharyngitis, unspecified etiology  Diagnosis management comments: Patient has normal exam normal vitals swabs are unremarkable will discharge patient with work note and ibuprofen.     Differential diagnosis sore throat viral syndrome PTA           Procedures

## 2022-11-25 NOTE — Clinical Note
[facility] EMERGENCY DEPT  [id number] [facility] [id number] [phone]    Work/School Note    Date: 11/25/2022    To Whom It May concern:      Skyla Gordon was seen and treated today in the emergency room by the following provider(s):  Attending Provider: Martin Hyman MD.      Skyla Gordon is excused from work/school on 11/25/22. He is clear to return to work/school on 11/26/22.         Sincerely,          oYon Ugarte MD

## 2022-11-26 LAB
BACTERIA SPEC CULT: ABNORMAL
BACTERIA SPEC CULT: ABNORMAL
SERVICE CMNT-IMP: ABNORMAL

## 2022-11-26 RX ORDER — AMOXICILLIN 500 MG/1
500 TABLET, FILM COATED ORAL 2 TIMES DAILY
Qty: 20 TABLET | Refills: 0 | Status: SHIPPED | OUTPATIENT
Start: 2022-11-26 | End: 2022-12-06

## 2022-11-26 NOTE — ED NOTES
2:37 PM  Call from microbiology. Throat culture GAS. Pt was not treated. Called and left message. Sent prescription for Amoxicillin to pharmacy electronically.

## 2024-09-14 NOTE — PROGRESS NOTES
Kevin Estrada M.D. FACS  PROGRESS NOTE    Name: Wicho Fenton MRN: [id number]   : 1980 Hospital: [facility] [place]   Date: 2022 Admission Date: 2022  8:01 PM     Hospital Day: 4  2 Days Post-Op  Subjective:  Patient sitting up on the side of the bed. He has not touched his breakfast.  He says he has had nausea but no vomiting. His pain is under better control. Objective:  Vitals:    22 1600 22 1631 22 1948 22 2333   BP:  135/75 125/78 122/68   Pulse: 82 79 83 78   Resp:  18 16 16   Temp:  97.9 °F (36.6 °C) 99.4 °F (37.4 °C) 98.7 °F (37.1 °C)   SpO2:  98% 96% 97%     Date 22 07 - 22 0659 22 0700 - 22 0659   Shift 3192-2299 9392-8000 24 Hour Total 9384-6689 8243-6910 24 Hour Total   INTAKE   P.O. 640 270 910        P. O. 640 270 910      NG/GT 0 0 0        Intake (ml) (Drain 19 fr. j p drain 22 Anterior Abdomen) 0 0 0      Shift Total 640 270 910      OUTPUT   Urine         Urine Voided 250  250        Urine Output (mL) (Urinary Catheter 22)       Drains 45 15 60        Output (ml) (Drain 19 fr. j p drain 22 Anterior Abdomen) 45 15 60      Shift Total       NET -305 -145 -450      Weight (kg)               Physical Exam:    General: Awake and alert, oriented x4, no apparent distress   Abdomen: abdomen is soft with incisional and ROSEANNE drain site tenderness. Incision(s) are C/D/I.  ROSEANNE drain with serosanguineous drainage.   No   masses, organomegaly or guarding   Extremities: No c/c/e BLE  Labs:  Recent Results (from the past 24 hour(s))   CBC WITH AUTOMATED DIFF    Collection Time: 22  3:07 AM   Result Value Ref Range    WBC 18.0 (H) 4.6 - 13.2 K/uL    RBC 4.37 4.35 - 5.65 M/uL    HGB 12.0 (L) 13.0 - 16.0 g/dL    HCT 36.0 36.0 - 48.0 %    MCV 82.4 78.0 - 100.0 FL    MCH 27.5 24.0 - 34.0 PG    MCHC 33.3 31.0 - 37.0 g/dL    RDW 13.6 11.6 - 14.5 %    PLATELET 289 198 - 769 K/uL MPV 9.5 9.2 - 11.8 FL    NRBC 0.0 0  WBC    ABSOLUTE NRBC 0.00 0.00 - 0.01 K/uL    NEUTROPHILS 85 (H) 40 - 73 %    LYMPHOCYTES 4 (L) 21 - 52 %    MONOCYTES 7 3 - 10 %    EOSINOPHILS 0 0 - 5 %    BASOPHILS 0 0 - 2 %    IMMATURE GRANULOCYTES 5 (H) 0.0 - 0.5 %    ABS. NEUTROPHILS 15.3 (H) 1.8 - 8.0 K/UL    ABS. LYMPHOCYTES 0.7 (L) 0.9 - 3.6 K/UL    ABS. MONOCYTES 1.2 0.05 - 1.2 K/UL    ABS. EOSINOPHILS 0.0 0.0 - 0.4 K/UL    ABS. BASOPHILS 0.1 0.0 - 0.1 K/UL    ABS. IMM.  GRANS. 0.8 (H) 0.00 - 0.04 K/UL    DF AUTOMATED     METABOLIC PANEL, BASIC    Collection Time: 06/27/22  3:07 AM   Result Value Ref Range    Sodium 133 (L) 136 - 145 mmol/L    Potassium 3.4 (L) 3.5 - 5.5 mmol/L    Chloride 98 (L) 100 - 111 mmol/L    CO2 30 21 - 32 mmol/L    Anion gap 5 3.0 - 18 mmol/L    Glucose 124 (H) 74 - 99 mg/dL    BUN 15 7.0 - 18 MG/DL    Creatinine 0.96 0.6 - 1.3 MG/DL    BUN/Creatinine ratio 16 12 - 20      GFR est AA >60 >60 ml/min/1.73m2    GFR est non-AA >60 >60 ml/min/1.73m2    Calcium 7.9 (L) 8.5 - 10.1 MG/DL     All Micro Results     Procedure Component Value Units Date/Time    CULTURE, BODY FLUID Bud Timothy STAIN [[id number]]  (Abnormal) Collected: 06/25/22 0834    Order Status: Completed Specimen: Fluid Updated: 06/26/22 1353     Special Requests: NO SPECIAL REQUESTS        GRAM STAIN RARE WBCS SEEN         NO ORGANISMS SEEN        Culture result: LIGHT GRAM NEGATIVE RODS       CULTURE, BLOOD [[id number]] Collected: 06/24/22 2245    Order Status: Completed Specimen: Blood Updated: 06/26/22 0655     Special Requests: NO SPECIAL REQUESTS        Culture result: NO GROWTH 2 DAYS       CULTURE, BLOOD [[id number]] Collected: 06/24/22 2230    Order Status: Completed Specimen: Blood Updated: 06/26/22 0655     Special Requests: NO SPECIAL REQUESTS        Culture result: NO GROWTH 2 DAYS       CULTURE, ANAEROBIC [[id number]] Collected: 06/25/22 0824    Order Status: Completed Specimen: Surgical Specimen Updated: 06/26/22 0009 CULTURE, TISSUE Orysia Devoid [[id number]] Collected: 06/25/22 0830    Order Status: Canceled Specimen: Appendix     COVID-19 RAPID TEST [[id number]] Collected: 06/24/22 2250    Order Status: Completed Specimen: Nasopharyngeal Updated: 06/24/22 2327     Specimen source Nasopharyngeal        COVID-19 rapid test Not detected        Comment: Rapid Abbott ID Now       Rapid NAAT:  The specimen is NEGATIVE for SARS-CoV-2, the novel coronavirus associated with COVID-19. Negative results should be treated as presumptive and, if inconsistent with clinical signs and symptoms or necessary for patient management, should be tested with an alternative molecular assay. Negative results do not preclude SARS-CoV-2 infection and should not be used as the sole basis for patient management decisions. This test has been authorized by the FDA under an Emergency Use Authorization (EUA) for use by authorized laboratories.    Fact sheet for Healthcare Providers: ConventionUpdate.co.nz  Fact sheet for Patients: ConventionEasyPaintdate.co.nz       Methodology: Isothermal Nucleic Acid Amplification               Current Medications:  Current Facility-Administered Medications   Medication Dose Route Frequency Provider Last Rate Last Admin    dextrose 5% and 0.9% NaCl 1,000 mL with potassium chloride 20 mEq infusion   IntraVENous CONTINUOUS Tigist Astudillo MD 75 mL/hr at 06/26/22 1108 New Bag at 06/26/22 1108    sodium chloride (NS) flush 5-40 mL  5-40 mL IntraVENous Q8H Tigist Astudillo MD   10 mL at 06/26/22 1329    sodium chloride (NS) flush 5-40 mL  5-40 mL IntraVENous PRN Tigist Astudillo MD        acetaminophen (TYLENOL) tablet 650 mg  650 mg Oral Q6H Tigist Astudillo MD   650 mg at 06/27/22 0540    oxyCODONE IR (ROXICODONE) tablet 10 mg  10 mg Oral Q6H PRN Tigist Astudillo MD        HYDROmorphone (DILAUDID) injection 1 mg  1 mg IntraVENous Q4H PRN Tigist Astudillo MD 1 mg at 06/27/22 0629    naloxone (NARCAN) injection 0.4 mg  0.4 mg IntraVENous PRN Ciaran Abel MD        [facility] injection 4 mg  4 mg IntraVENous Q4H PRN Ciaran Abel MD   4 mg at 06/27/22 0629    diphenhydrAMINE (BENADRYL) injection 12.5 mg  12.5 mg IntraVENous Q4H PRN Ciaran Abel MD        enoxaparin (LOVENOX) injection 40 mg  40 mg SubCUTAneous Q24H Ciaran Abel MD   40 mg at 06/26/22 1509    piperacillin-tazobactam (ZOSYN) 3.375 g in 0.9% sodium chloride (MBP/ADV) 100 mL MBP  3.375 g IntraVENous Q8H Ciaran Abel MD 25 mL/hr at 06/27/22 0325 3.375 g at 06/27/22 0325       Chart and notes reviewed. Data reviewed. I have evaluated and examined the patient. IMPRESSION:   · Postop day 2 from laparoscopic appendectomy for acute perforated appendicitis with abscess. PLAN:/DISCUSION:   · Infectious disease-continue Zosyn 3.375 g IV every 8 hours.   Awaiting culture results  · Switch to oral pain control only  · Encourage p.o. intake  · Encourage incentive spirometry usage, out of bed to chair, ambulate in hallway  · Continue Lovenox and SCDs for DVT prophylaxis        Omero Valenzuela MD 62.1

## (undated) DEVICE — REM POLYHESIVE ADULT PATIENT RETURN ELECTRODE: Brand: VALLEYLAB

## (undated) DEVICE — LIGHT HANDLE COVER ,2 PER PACK: Brand: DEROYAL

## (undated) DEVICE — FCPS ENDOSCP 5MMX33CM -- ENDOPATH

## (undated) DEVICE — STERILE POLYISOPRENE POWDER-FREE SURGICAL GLOVES: Brand: PROTEXIS

## (undated) DEVICE — SHEARS ENDOSCP HARM 36CM ULTRASONIC CRV TIP UPGRD

## (undated) DEVICE — INTENDED FOR TISSUE SEPARATION, AND OTHER PROCEDURES THAT REQUIRE A SHARP SURGICAL BLADE TO PUNCTURE OR CUT.: Brand: BARD-PARKER SAFETY BLADES SIZE 11, STERILE

## (undated) DEVICE — SUTURE MCRYL SZ 4-0 L27IN ABSRB UD L24MM PS-1 3/8 CIR PRIM Y935H

## (undated) DEVICE — Device

## (undated) DEVICE — DRAPE TOWEL: Brand: CONVERTORS

## (undated) DEVICE — DRAIN SURG L49IN DIA63MM H PAT 10IN SIL RND W TRCR RADPQ

## (undated) DEVICE — GLOVE SURG SZ 7.5 L11.73IN FNGR THK9.8MIL STRW LTX POLYMER

## (undated) DEVICE — TISSUE RETRIEVAL SYSTEM: Brand: INZII RETRIEVAL SYSTEM

## (undated) DEVICE — SUTURE VCRL SZ 0 L27IN ABSRB UD L36MM CT-1 1/2 CIR J260H

## (undated) DEVICE — SUTURE VCRL SZ 0 L18IN ABSRB UD POLYGLACTIN 910 BRAID TIE J912G

## (undated) DEVICE — SOLUTION IV 1000ML 0.9% SOD CHL

## (undated) DEVICE — KIT CLN UP BON SECOURS MARYV

## (undated) DEVICE — TROCAR: Brand: KII® OPTICAL ACCESS SYSTEM

## (undated) DEVICE — TROCAR: Brand: KII® SLEEVE

## (undated) DEVICE — GRASPER RMFG BABCOCK 5MM -- LAWSON OEM ITEM 112763

## (undated) DEVICE — CUTTER ENDOSCP L340MM LIN ARTC SGL STROKE FIRING ENDOPATH

## (undated) DEVICE — SMOKE EVACUATION PENCIL: Brand: VALLEYLAB

## (undated) DEVICE — NEEDLE HYPO 25GA L1.5IN BVL ORIENTED ECLIPSE

## (undated) DEVICE — STRIP,CLOSURE,WOUND,MEDI-STRIP,1/2X4: Brand: MEDLINE

## (undated) DEVICE — SOLUTION LACTATED RINGERS INJECTION USP

## (undated) DEVICE — 3M™ STERI-STRIP™ COMPOUND BENZOIN TINCTURE 40 BAGS/CARTON 4 CARTONS/CASE C1544: Brand: 3M™ STERI-STRIP™

## (undated) DEVICE — LAPAROSCOPIC TROCAR SLEEVE/SINGLE USE: Brand: KII® OPTICAL ACCESS SYSTEM

## (undated) DEVICE — PREP SKN CHLRAPRP APL 26ML STR --

## (undated) DEVICE — RELOAD STPL SZ 0 L45MM DIA3.5MM 0DEG STD REG TISS BLU TI